# Patient Record
Sex: FEMALE | Race: WHITE | NOT HISPANIC OR LATINO | Employment: FULL TIME | URBAN - METROPOLITAN AREA
[De-identification: names, ages, dates, MRNs, and addresses within clinical notes are randomized per-mention and may not be internally consistent; named-entity substitution may affect disease eponyms.]

---

## 2017-03-13 ENCOUNTER — COMMUNICATION - HEALTHEAST (OUTPATIENT)
Dept: INTERNAL MEDICINE | Facility: CLINIC | Age: 62
End: 2017-03-13

## 2017-03-13 ENCOUNTER — AMBULATORY - HEALTHEAST (OUTPATIENT)
Dept: INTERNAL MEDICINE | Facility: CLINIC | Age: 62
End: 2017-03-13

## 2017-03-13 DIAGNOSIS — R79.89 ELEVATED TSH: ICD-10-CM

## 2017-03-17 ENCOUNTER — AMBULATORY - HEALTHEAST (OUTPATIENT)
Dept: LAB | Facility: CLINIC | Age: 62
End: 2017-03-17

## 2017-03-17 DIAGNOSIS — R79.89 ELEVATED TSH: ICD-10-CM

## 2017-07-07 ENCOUNTER — COMMUNICATION - HEALTHEAST (OUTPATIENT)
Dept: INTERNAL MEDICINE | Facility: CLINIC | Age: 62
End: 2017-07-07

## 2017-07-07 DIAGNOSIS — N95.2 ATROPHIC VAGINITIS: ICD-10-CM

## 2017-10-02 ENCOUNTER — COMMUNICATION - HEALTHEAST (OUTPATIENT)
Dept: INTERNAL MEDICINE | Facility: CLINIC | Age: 62
End: 2017-10-02

## 2017-10-02 DIAGNOSIS — F41.9 ANXIETY: ICD-10-CM

## 2017-10-09 ENCOUNTER — RECORDS - HEALTHEAST (OUTPATIENT)
Dept: ADMINISTRATIVE | Facility: OTHER | Age: 62
End: 2017-10-09

## 2017-10-10 ENCOUNTER — AMBULATORY - HEALTHEAST (OUTPATIENT)
Dept: LAB | Facility: CLINIC | Age: 62
End: 2017-10-10

## 2017-10-10 DIAGNOSIS — M25.50 ARTHRALGIA OF MULTIPLE JOINTS: ICD-10-CM

## 2017-10-12 LAB — ANA SER QL: 0.4 U

## 2017-11-06 ENCOUNTER — COMMUNICATION - HEALTHEAST (OUTPATIENT)
Dept: INTERNAL MEDICINE | Facility: CLINIC | Age: 62
End: 2017-11-06

## 2017-11-06 DIAGNOSIS — F41.9 ANXIETY: ICD-10-CM

## 2017-11-07 ENCOUNTER — COMMUNICATION - HEALTHEAST (OUTPATIENT)
Dept: INTERNAL MEDICINE | Facility: CLINIC | Age: 62
End: 2017-11-07

## 2017-11-07 DIAGNOSIS — F41.9 ANXIETY: ICD-10-CM

## 2017-11-08 ENCOUNTER — COMMUNICATION - HEALTHEAST (OUTPATIENT)
Dept: INTERNAL MEDICINE | Facility: CLINIC | Age: 62
End: 2017-11-08

## 2017-11-16 ENCOUNTER — AMBULATORY - HEALTHEAST (OUTPATIENT)
Dept: INTERNAL MEDICINE | Facility: CLINIC | Age: 62
End: 2017-11-16

## 2017-11-16 ENCOUNTER — COMMUNICATION - HEALTHEAST (OUTPATIENT)
Dept: INTERNAL MEDICINE | Facility: CLINIC | Age: 62
End: 2017-11-16

## 2017-11-16 DIAGNOSIS — F41.9 ANXIETY: ICD-10-CM

## 2017-11-27 ENCOUNTER — RECORDS - HEALTHEAST (OUTPATIENT)
Dept: ADMINISTRATIVE | Facility: OTHER | Age: 62
End: 2017-11-27

## 2018-01-04 ENCOUNTER — COMMUNICATION - HEALTHEAST (OUTPATIENT)
Dept: INTERNAL MEDICINE | Facility: CLINIC | Age: 63
End: 2018-01-04

## 2018-01-04 DIAGNOSIS — F41.9 ANXIETY: ICD-10-CM

## 2018-02-02 ENCOUNTER — COMMUNICATION - HEALTHEAST (OUTPATIENT)
Dept: INTERNAL MEDICINE | Facility: CLINIC | Age: 63
End: 2018-02-02

## 2018-02-02 DIAGNOSIS — F41.9 ANXIETY: ICD-10-CM

## 2018-04-03 ENCOUNTER — COMMUNICATION - HEALTHEAST (OUTPATIENT)
Dept: INTERNAL MEDICINE | Facility: CLINIC | Age: 63
End: 2018-04-03

## 2018-04-03 DIAGNOSIS — F41.9 ANXIETY: ICD-10-CM

## 2018-04-20 ENCOUNTER — OFFICE VISIT - HEALTHEAST (OUTPATIENT)
Dept: INTERNAL MEDICINE | Facility: CLINIC | Age: 63
End: 2018-04-20

## 2018-04-20 DIAGNOSIS — R42 LIGHT HEADEDNESS: ICD-10-CM

## 2018-04-20 DIAGNOSIS — N95.2 ATROPHIC VAGINITIS: ICD-10-CM

## 2018-04-20 DIAGNOSIS — E55.9 VITAMIN D DEFICIENCY: ICD-10-CM

## 2018-04-20 DIAGNOSIS — F41.9 ANXIETY: ICD-10-CM

## 2018-04-20 DIAGNOSIS — M85.80 LOW BONE MASS: ICD-10-CM

## 2018-04-20 DIAGNOSIS — Z00.00 PREVENTATIVE HEALTH CARE: ICD-10-CM

## 2018-04-20 DIAGNOSIS — Z12.11 SCREEN FOR COLON CANCER: ICD-10-CM

## 2018-04-20 LAB
ANION GAP SERPL CALCULATED.3IONS-SCNC: 13 MMOL/L (ref 5–18)
BUN SERPL-MCNC: 10 MG/DL (ref 8–22)
CALCIUM SERPL-MCNC: 9.5 MG/DL (ref 8.5–10.5)
CHLORIDE BLD-SCNC: 105 MMOL/L (ref 98–107)
CHOLEST SERPL-MCNC: 244 MG/DL
CO2 SERPL-SCNC: 22 MMOL/L (ref 22–31)
CREAT SERPL-MCNC: 0.76 MG/DL (ref 0.6–1.1)
FASTING STATUS PATIENT QL REPORTED: YES
GFR SERPL CREATININE-BSD FRML MDRD: >60 ML/MIN/1.73M2
GLUCOSE BLD-MCNC: 95 MG/DL (ref 70–125)
HDLC SERPL-MCNC: 70 MG/DL
LDLC SERPL CALC-MCNC: 148 MG/DL
POTASSIUM BLD-SCNC: 4.6 MMOL/L (ref 3.5–5)
SODIUM SERPL-SCNC: 140 MMOL/L (ref 136–145)
TRIGL SERPL-MCNC: 132 MG/DL

## 2018-04-20 ASSESSMENT — MIFFLIN-ST. JEOR: SCORE: 1085.2

## 2018-04-23 LAB
25(OH)D3 SERPL-MCNC: 31.9 NG/ML (ref 30–80)
25(OH)D3 SERPL-MCNC: 31.9 NG/ML (ref 30–80)

## 2018-04-25 ENCOUNTER — RECORDS - HEALTHEAST (OUTPATIENT)
Dept: MAMMOGRAPHY | Facility: CLINIC | Age: 63
End: 2018-04-25

## 2018-04-25 DIAGNOSIS — Z00.00 ENCOUNTER FOR GENERAL ADULT MEDICAL EXAMINATION WITHOUT ABNORMAL FINDINGS: ICD-10-CM

## 2018-04-30 ENCOUNTER — RECORDS - HEALTHEAST (OUTPATIENT)
Dept: ADMINISTRATIVE | Facility: OTHER | Age: 63
End: 2018-04-30

## 2018-10-26 ENCOUNTER — OFFICE VISIT - HEALTHEAST (OUTPATIENT)
Dept: FAMILY MEDICINE | Facility: CLINIC | Age: 63
End: 2018-10-26

## 2018-10-26 DIAGNOSIS — N39.0 URINARY TRACT INFECTION WITHOUT HEMATURIA, SITE UNSPECIFIED: ICD-10-CM

## 2018-10-26 DIAGNOSIS — R30.0 DYSURIA: ICD-10-CM

## 2018-10-26 DIAGNOSIS — L29.2 VULVAR ITCHING: ICD-10-CM

## 2018-10-26 LAB
ALBUMIN UR-MCNC: NEGATIVE MG/DL
APPEARANCE UR: CLEAR
BACTERIA #/AREA URNS HPF: ABNORMAL HPF
BILIRUB UR QL STRIP: NEGATIVE
CLUE CELLS: NORMAL
COLOR UR AUTO: YELLOW
GLUCOSE UR STRIP-MCNC: NEGATIVE MG/DL
HGB UR QL STRIP: NEGATIVE
KETONES UR STRIP-MCNC: NEGATIVE MG/DL
LEUKOCYTE ESTERASE UR QL STRIP: ABNORMAL
NITRATE UR QL: NEGATIVE
PH UR STRIP: 5.5 [PH] (ref 5–8)
RBC #/AREA URNS AUTO: ABNORMAL HPF
SP GR UR STRIP: 1.01 (ref 1–1.03)
SQUAMOUS #/AREA URNS AUTO: ABNORMAL LPF
TRICHOMONAS, WET PREP: NORMAL
UROBILINOGEN UR STRIP-ACNC: ABNORMAL
WBC #/AREA URNS AUTO: ABNORMAL HPF
YEAST, WET PREP: NORMAL

## 2018-11-16 ENCOUNTER — RECORDS - HEALTHEAST (OUTPATIENT)
Dept: ADMINISTRATIVE | Facility: OTHER | Age: 63
End: 2018-11-16

## 2019-03-04 ENCOUNTER — COMMUNICATION - HEALTHEAST (OUTPATIENT)
Dept: INTERNAL MEDICINE | Facility: CLINIC | Age: 64
End: 2019-03-04

## 2019-03-04 DIAGNOSIS — N95.2 ATROPHIC VAGINITIS: ICD-10-CM

## 2019-04-09 ENCOUNTER — COMMUNICATION - HEALTHEAST (OUTPATIENT)
Dept: INTERNAL MEDICINE | Facility: CLINIC | Age: 64
End: 2019-04-09

## 2019-05-23 ENCOUNTER — COMMUNICATION - HEALTHEAST (OUTPATIENT)
Dept: INTERNAL MEDICINE | Facility: CLINIC | Age: 64
End: 2019-05-23

## 2019-05-31 ENCOUNTER — COMMUNICATION - HEALTHEAST (OUTPATIENT)
Dept: INTERNAL MEDICINE | Facility: CLINIC | Age: 64
End: 2019-05-31

## 2019-05-31 DIAGNOSIS — N95.2 ATROPHIC VAGINITIS: ICD-10-CM

## 2019-06-28 ENCOUNTER — COMMUNICATION - HEALTHEAST (OUTPATIENT)
Dept: INTERNAL MEDICINE | Facility: CLINIC | Age: 64
End: 2019-06-28

## 2019-07-25 ENCOUNTER — COMMUNICATION - HEALTHEAST (OUTPATIENT)
Dept: INTERNAL MEDICINE | Facility: CLINIC | Age: 64
End: 2019-07-25

## 2019-08-29 ENCOUNTER — COMMUNICATION - HEALTHEAST (OUTPATIENT)
Dept: INTERNAL MEDICINE | Facility: CLINIC | Age: 64
End: 2019-08-29

## 2020-03-02 ENCOUNTER — RECORDS - HEALTHEAST (OUTPATIENT)
Dept: ADMINISTRATIVE | Facility: OTHER | Age: 65
End: 2020-03-02

## 2020-06-25 ENCOUNTER — COMMUNICATION - HEALTHEAST (OUTPATIENT)
Dept: INTERNAL MEDICINE | Facility: CLINIC | Age: 65
End: 2020-06-25

## 2020-06-25 DIAGNOSIS — Z12.31 ENCOUNTER FOR SCREENING MAMMOGRAM FOR MALIGNANT NEOPLASM OF BREAST: ICD-10-CM

## 2020-06-25 DIAGNOSIS — Z00.00 WELCOME TO MEDICARE PREVENTIVE VISIT: ICD-10-CM

## 2020-06-25 DIAGNOSIS — E55.9 VITAMIN D DEFICIENCY: ICD-10-CM

## 2020-07-08 ENCOUNTER — HOSPITAL ENCOUNTER (OUTPATIENT)
Dept: ULTRASOUND IMAGING | Facility: CLINIC | Age: 65
Discharge: HOME | End: 2020-07-08
Attending: INTERNAL MEDICINE

## 2020-07-08 ENCOUNTER — AMBULATORY - HEALTHEAST (OUTPATIENT)
Dept: LAB | Facility: CLINIC | Age: 65
End: 2020-07-08

## 2020-07-08 DIAGNOSIS — E55.9 VITAMIN D DEFICIENCY: ICD-10-CM

## 2020-07-08 DIAGNOSIS — Z00.00 WELCOME TO MEDICARE PREVENTIVE VISIT: ICD-10-CM

## 2020-07-08 LAB
ALBUMIN SERPL-MCNC: 4 G/DL (ref 3.5–5)
ALP SERPL-CCNC: 56 U/L (ref 45–120)
ALT SERPL W P-5'-P-CCNC: 11 U/L (ref 0–45)
ANION GAP SERPL CALCULATED.3IONS-SCNC: 10 MMOL/L (ref 5–18)
AST SERPL W P-5'-P-CCNC: 16 U/L (ref 0–40)
BILIRUB SERPL-MCNC: 0.5 MG/DL (ref 0–1)
BUN SERPL-MCNC: 12 MG/DL (ref 8–22)
CALCIUM SERPL-MCNC: 9.3 MG/DL (ref 8.5–10.5)
CHLORIDE BLD-SCNC: 106 MMOL/L (ref 98–107)
CHOLEST SERPL-MCNC: 219 MG/DL
CO2 SERPL-SCNC: 24 MMOL/L (ref 22–31)
CREAT SERPL-MCNC: 0.79 MG/DL (ref 0.6–1.1)
ERYTHROCYTE [DISTWIDTH] IN BLOOD BY AUTOMATED COUNT: 11.6 % (ref 11–14.5)
FASTING STATUS PATIENT QL REPORTED: YES
GFR SERPL CREATININE-BSD FRML MDRD: >60 ML/MIN/1.73M2
GLUCOSE BLD-MCNC: 98 MG/DL (ref 70–125)
HBA1C MFR BLD: 5.6 % (ref 3.5–6)
HCT VFR BLD AUTO: 43.7 % (ref 35–47)
HDLC SERPL-MCNC: 73 MG/DL
HGB BLD-MCNC: 14.4 G/DL (ref 12–16)
LDLC SERPL CALC-MCNC: 115 MG/DL
MCH RBC QN AUTO: 31.1 PG (ref 27–34)
MCHC RBC AUTO-ENTMCNC: 33 G/DL (ref 32–36)
MCV RBC AUTO: 94 FL (ref 80–100)
PLATELET # BLD AUTO: 204 THOU/UL (ref 140–440)
PMV BLD AUTO: 8.3 FL (ref 7–10)
POTASSIUM BLD-SCNC: 4.3 MMOL/L (ref 3.5–5)
PROT SERPL-MCNC: 6.5 G/DL (ref 6–8)
RBC # BLD AUTO: 4.64 MILL/UL (ref 3.8–5.4)
SODIUM SERPL-SCNC: 140 MMOL/L (ref 136–145)
TRIGL SERPL-MCNC: 155 MG/DL
WBC: 4.5 THOU/UL (ref 4–11)

## 2020-07-09 LAB
25(OH)D3 SERPL-MCNC: 32.4 NG/ML (ref 30–80)
25(OH)D3 SERPL-MCNC: 32.4 NG/ML (ref 30–80)

## 2020-08-04 ENCOUNTER — HOSPITAL ENCOUNTER (OUTPATIENT)
Dept: MAMMOGRAPHY | Facility: CLINIC | Age: 65
Discharge: HOME | End: 2020-08-04
Attending: INTERNAL MEDICINE

## 2020-08-04 DIAGNOSIS — Z00.00 WELCOME TO MEDICARE PREVENTIVE VISIT: ICD-10-CM

## 2020-08-04 DIAGNOSIS — Z12.31 ENCOUNTER FOR SCREENING MAMMOGRAM FOR MALIGNANT NEOPLASM OF BREAST: ICD-10-CM

## 2020-09-25 ENCOUNTER — OFFICE VISIT - HEALTHEAST (OUTPATIENT)
Dept: INTERNAL MEDICINE | Facility: CLINIC | Age: 65
End: 2020-09-25

## 2020-09-25 DIAGNOSIS — F41.9 ANXIETY: ICD-10-CM

## 2020-09-25 DIAGNOSIS — N95.2 ATROPHIC VAGINITIS: ICD-10-CM

## 2020-09-25 DIAGNOSIS — G89.29 CHRONIC PAIN OF LEFT KNEE: ICD-10-CM

## 2020-09-25 DIAGNOSIS — E55.9 VITAMIN D DEFICIENCY: ICD-10-CM

## 2020-09-25 DIAGNOSIS — M25.562 CHRONIC PAIN OF LEFT KNEE: ICD-10-CM

## 2020-09-25 DIAGNOSIS — M85.80 LOW BONE MASS: ICD-10-CM

## 2020-09-25 DIAGNOSIS — F34.1 DYSTHYMIC DISORDER: ICD-10-CM

## 2020-09-25 DIAGNOSIS — Z00.00 WELCOME TO MEDICARE PREVENTIVE VISIT: ICD-10-CM

## 2020-09-25 RX ORDER — SERTRALINE HYDROCHLORIDE 100 MG/1
100 TABLET, FILM COATED ORAL DAILY
Qty: 90 TABLET | Refills: 3 | Status: SHIPPED | OUTPATIENT
Start: 2020-09-25 | End: 2021-08-27

## 2020-09-25 ASSESSMENT — MIFFLIN-ST. JEOR: SCORE: 1046.64

## 2020-09-29 LAB
ATRIAL RATE - MUSE: 73 BPM
DIASTOLIC BLOOD PRESSURE - MUSE: NORMAL
INTERPRETATION ECG - MUSE: NORMAL
P AXIS - MUSE: 69 DEGREES
PR INTERVAL - MUSE: 174 MS
QRS DURATION - MUSE: 64 MS
QT - MUSE: 394 MS
QTC - MUSE: 434 MS
R AXIS - MUSE: -2 DEGREES
SYSTOLIC BLOOD PRESSURE - MUSE: NORMAL
T AXIS - MUSE: 85 DEGREES
VENTRICULAR RATE- MUSE: 73 BPM

## 2020-09-30 ENCOUNTER — COMMUNICATION - HEALTHEAST (OUTPATIENT)
Dept: INTERNAL MEDICINE | Facility: CLINIC | Age: 65
End: 2020-09-30

## 2020-10-22 ENCOUNTER — VIRTUAL VISIT (OUTPATIENT)
Dept: FAMILY MEDICINE | Facility: OTHER | Age: 65
End: 2020-10-22
Payer: COMMERCIAL

## 2020-10-22 ENCOUNTER — AMBULATORY - HEALTHEAST (OUTPATIENT)
Dept: INTERNAL MEDICINE | Facility: CLINIC | Age: 65
End: 2020-10-22

## 2020-10-22 DIAGNOSIS — Z20.822 SUSPECTED 2019 NOVEL CORONAVIRUS INFECTION: ICD-10-CM

## 2020-10-22 PROCEDURE — 99421 OL DIG E/M SVC 5-10 MIN: CPT | Performed by: PHYSICIAN ASSISTANT

## 2020-10-22 NOTE — PROGRESS NOTES
"Date: 10/22/2020 12:15:30  Clinician: Maciel Maxwell  Clinician NPI: [id number]  Patient: Addie Carbone  Patient : 1955  Patient Address: [address] [place] [address]  Patient Phone: [phone]  Visit Protocol: URI  Patient Summary:  Addie is a 64 year old ( : 1955 ) female who initiated a [facility] Visit for COVID-19 (Coronavirus) evaluation and screening. When asked the question \"Please sign me up to receive news, health information and promotions from [facility].\", Addie responded \"Yes\".    Addie states her symptoms started suddenly 3-4 days ago.   Her symptoms consist of ear pain, a headache, a cough, nasal congestion, rhinitis, and malaise. She is experiencing difficulty breathing due to nasal congestion but she is not short of breath.   Symptom details     Nasal secretions: The color of her mucus is clear.    Cough: Addie coughs a few times an hour and her cough is not more bothersome at night. Phlegm does not come into her throat when she coughs. She believes her cough is caused by post-nasal drip.     Headache: She states the headache is mild (1-3 on a 10 point pain scale).      Addie denies having wheezing, fever, anosmia, vomiting, nausea, facial pain or pressure, myalgias, chills, sore throat, teeth pain, ageusia, and diarrhea. She also denies double sickening (worsening symptoms after initial improvement), taking antibiotic medication in the past month, and having recent facial or sinus surgery in the past 60 days.   Precipitating events  She has not recently been exposed to someone with influenza. Addie has been in close contact with the following high risk individuals: adults 65 or older.   Pertinent COVID-19 (Coronavirus) information  In the past 14 days, Addie has not worked in a congregate living setting.   She does not work or volunteer as healthcare worker or a  and does not work or volunteer in a healthcare facility.   Addie also has not lived in a congregate " living setting in the past 14 days. She does not live with a healthcare worker.   Addie has not had a close contact with a laboratory-confirmed COVID-19 patient within 14 days of symptom onset.   Since December 2019, Addie and has not had upper respiratory infection or influenza-like illness. Has not been diagnosed with lab-confirmed COVID-19 test   Pertinent medical history  Addie does not get yeast infections when she takes antibiotics.   Addie does not need a return to work/school note.   Weight: 120 lbs   Addie does not smoke or use smokeless tobacco.   Additional information as reported by the patient (free text): Symptoms started with a sore throat which has resolved. Have been running a low grade fever. My  is responsible for a team processing (onsite) [employer] absentee ballots. I have been isolating from him. Am wondering if I should rule out Covid...would hate to infect the election results team!!   Weight: 120 lbs    MEDICATIONS: Vagifem vaginal, alendronate oral, sertraline oral, ALLERGIES: Sulfa (Sulfonamide Antibiotics)  Clinician Response:  Dear Addei,   Based on your exposure to COVID-19 (coronavirus), we would like to test you for this virus.  1. Please call [phone] to schedule your visit. Explain that you were referred by [facility] to have a COVID-19 test. Be ready to share your [facility] visit ID number.  The following will serve as your written order for this COVID Test, ordered by me, for the indication of suspected COVID [Z20.828]: The test will be ordered in [employer], our electronic health record, after you are scheduled. It will show as ordered and authorized by Ahsan Cooper MD.  Order: COVID-19 (coronavirus) PCR for ASYMPTOMATIC EXPOSURE testing from [facility].  If you know you have had close contact with someone who tested positive, you should be quarantined for 14 days after this exposure. You should stay in quarantine for the14 days even if the covid test is negative, the optimal time to  test after exposure is 5-7 days from the exposure  Quarantine means   What should I do?  For safety, it's very important to follow these rules. Do this for 14 days after the date you were last exposed to the virus..  Stay home and away from others. Don't go to school or anywhere else. Generally quarantine means staying home from work but there are some exceptions to this. Please contact your workplace.   No hugging, kissing or shaking hands.  Don't let anyone visit.  Cover your mouth and nose with a mask, tissue or washcloth to avoid spreading germs.  Wash your hands and face often. Use soap and water.  What are the symptoms of COVID-19?  The most common symptoms are cough, fever and trouble breathing. Less common symptoms include headache, body aches, fatigue (feeling very tired), chills, sore throat, stuffy or runny nose, diarrhea (loose poop), loss of taste or smell, belly pain, and nausea or vomiting (feeling sick to your stomach or throwing up).  After 14 days, if you have still don't have symptoms, you likely don't have this virus.  If you develop symptoms, follow these guidelines.  If you're normally healthy: Please start another [facility] visit to report your symptoms. Go to [email or web address].  If you have a serious health problem (like cancer, heart failure, an organ transplant or kidney disease): Call your specialty clinic. Let them know that you might have COVID-19.  2. When it's time for your COVID test:  Stay at least 6 feet away from others. (If someone will drive you to your test, stay in the backseat, as far away from the  as you can.)  Cover your mouth and nose with a mask, tissue or washcloth.  Go straight to the testing site. Don't make any stops on the way there or back.  Please note  Caregivers in these groups are at risk for severe illness due to COVID-19:  o People 65 years and older  o People who live in a nursing home or long-term care facility  o People with chronic disease (lung, heart,  cancer, diabetes, kidney, liver, immunologic)  o People who have a weakened immune system, including those who:  Are in cancer treatment  Take medicine that weakens the immune system, such as corticosteroids  Had a bone marrow or organ transplant  Have an immune deficiency  Have poorly controlled HIV or AIDS  Are obese (body mass index of 40 or higher)  Smoke regularly  Where can I get more information?  [facility] -- About COVID-19: [email or web address]  CDC -- What to Do If You're Sick: www.cdc.gov/coronavirus/2019-ncov/about/steps-when-sick.html  CDC -- Ending Home Isolation: www.cdc.gov/coronavirus/2019-ncov/hcp/disposition-in-home-patients.html  CDC -- Caring for Someone: www.cdc.gov/coronavirus/2019-ncov/if-you-are-sick/care-for-someone.html  [facility] -- Interim Guidance for Hospital Discharge to Home: www.health.[facility].mn./diseases/coronavirus/hcp/hospdischarge.pdf  [facility] clinical trials (COVID-19 research studies): [email or web address]-clinical-trials  Below are the COVID-19 hotlines at the [facility] of [facility] ([facility]). Interpreters are available.  For health questions: Call 631-449-7340 or 1-643.395.4620 (7 a.m. to 7 p.m.)  For questions about schools and childcare: Call 943-646-9754 or 1-882.594.1130 (7 a.m. to 7 p.m.)    Diagnosis: Contact with and (suspected) exposure to other viral communicable diseases  Diagnosis ICD: Z20.828

## 2020-10-25 ENCOUNTER — AMBULATORY - HEALTHEAST (OUTPATIENT)
Dept: FAMILY MEDICINE | Facility: CLINIC | Age: 65
End: 2020-10-25

## 2020-10-25 DIAGNOSIS — Z20.822 SUSPECTED 2019 NOVEL CORONAVIRUS INFECTION: ICD-10-CM

## 2020-11-17 ENCOUNTER — COMMUNICATION - HEALTHEAST (OUTPATIENT)
Dept: INTERNAL MEDICINE | Facility: CLINIC | Age: 65
End: 2020-11-17

## 2020-11-17 DIAGNOSIS — M85.80 LOW BONE MASS: ICD-10-CM

## 2020-11-17 DIAGNOSIS — N95.2 ATROPHIC VAGINITIS: ICD-10-CM

## 2020-12-28 ENCOUNTER — RECORDS - HEALTHEAST (OUTPATIENT)
Dept: ADMINISTRATIVE | Facility: OTHER | Age: 65
End: 2020-12-28

## 2021-03-08 ENCOUNTER — COMMUNICATION - HEALTHEAST (OUTPATIENT)
Dept: INTERNAL MEDICINE | Facility: CLINIC | Age: 66
End: 2021-03-08

## 2021-03-08 DIAGNOSIS — N95.2 ATROPHIC VAGINITIS: ICD-10-CM

## 2021-03-08 DIAGNOSIS — M85.80 LOW BONE MASS: ICD-10-CM

## 2021-03-09 RX ORDER — ESTRADIOL 10 UG/1
INSERT VAGINAL
Qty: 108 TABLET | Refills: 2 | Status: SHIPPED | OUTPATIENT
Start: 2021-03-09 | End: 2021-09-22

## 2021-05-07 ENCOUNTER — OFFICE VISIT - HEALTHEAST (OUTPATIENT)
Dept: INTERNAL MEDICINE | Facility: CLINIC | Age: 66
End: 2021-05-07

## 2021-05-07 DIAGNOSIS — M25.562 LEFT KNEE PAIN, UNSPECIFIED CHRONICITY: ICD-10-CM

## 2021-05-07 ASSESSMENT — PATIENT HEALTH QUESTIONNAIRE - PHQ9: SUM OF ALL RESPONSES TO PHQ QUESTIONS 1-9: 0

## 2021-05-24 ENCOUNTER — RECORDS - HEALTHEAST (OUTPATIENT)
Dept: ADMINISTRATIVE | Facility: OTHER | Age: 66
End: 2021-05-24

## 2021-05-27 ASSESSMENT — PATIENT HEALTH QUESTIONNAIRE - PHQ9: SUM OF ALL RESPONSES TO PHQ QUESTIONS 1-9: 0

## 2021-05-29 NOTE — TELEPHONE ENCOUNTER
RN cannot approve Refill Request    RN can NOT refill this medication Protocol failed and NO refill given. Last office visit: 4/20/2018 Sanjana Mike MD Last Physical: 8/31/2016 Last MTM visit: Visit date not found Last visit same specialty: 4/20/2018 Sanjana Mike MD.  Next visit within 3 mo: Visit date not found  Next physical within 3 mo: Visit date not found      Carlene Rocha, [facility] Triage/Med Refill 6/2/2019    Requested Prescriptions   Pending Prescriptions Disp Refills     estradiol (VAGIFEM) 10 mcg Tab [Pharmacy Med Name: ESTRADIOL 10 MCG TAB 10 TAB] 12 tablet 2     Sig: INSERT 1 TABLET (10 MCG TOTAL) INTO THE VAGINA 3 TIMES A WEEK.       Hormone Replacement Therapy Refill Protocol Failed - 5/31/2019  2:21 PM        Failed - PCP or prescribing provider visit in past 12 months       Last office visit with prescriber/PCP: 4/20/2018 Sanjana Mike MD OR same dept: Visit date not found OR same specialty: 4/20/2018 Sanjana Mike MD  Last physical: 8/31/2016 Last MTM visit: Visit date not found     Next visit within 3 mo: Visit date not found  Next physical within 3 mo: Visit date not found  Prescriber OR PCP: Sanjana Mike MD  Last diagnosis associated with med order: 1. Atrophic vaginitis  - estradiol (VAGIFEM) 10 mcg Tab [Pharmacy Med Name: ESTRADIOL 10 MCG TAB 10 TAB]; INSERT 1 TABLET (10 MCG TOTAL) INTO THE VAGINA 3 TIMES A WEEK.  Dispense: 12 tablet; Refill: 2     If protocol passes may refill for 3 months.

## 2021-05-31 ENCOUNTER — RECORDS - HEALTHEAST (OUTPATIENT)
Dept: INTERNAL MEDICINE | Facility: CLINIC | Age: 66
End: 2021-05-31

## 2021-06-01 ENCOUNTER — AMBULATORY - HEALTHEAST (OUTPATIENT)
Dept: INTERNAL MEDICINE | Facility: CLINIC | Age: 66
End: 2021-06-01

## 2021-06-01 VITALS — HEIGHT: 61 IN | BODY MASS INDEX: 25.3 KG/M2 | WEIGHT: 134 LBS

## 2021-06-01 DIAGNOSIS — Z12.12 ENCOUNTER FOR COLORECTAL CANCER SCREENING: ICD-10-CM

## 2021-06-01 DIAGNOSIS — Z12.11 ENCOUNTER FOR COLORECTAL CANCER SCREENING: ICD-10-CM

## 2021-06-02 ENCOUNTER — COMMUNICATION - HEALTHEAST (OUTPATIENT)
Dept: INTERNAL MEDICINE | Facility: CLINIC | Age: 66
End: 2021-06-02

## 2021-06-02 ENCOUNTER — RECORDS - HEALTHEAST (OUTPATIENT)
Dept: ADMINISTRATIVE | Facility: CLINIC | Age: 66
End: 2021-06-02

## 2021-06-02 VITALS — BODY MASS INDEX: 24.81 KG/M2 | WEIGHT: 131.3 LBS

## 2021-06-02 DIAGNOSIS — M85.80 LOW BONE MASS: ICD-10-CM

## 2021-06-03 RX ORDER — ALENDRONATE SODIUM 70 MG/1
TABLET ORAL
Qty: 12 TABLET | Refills: 0 | Status: SHIPPED | OUTPATIENT
Start: 2021-06-03 | End: 2021-08-27

## 2021-06-05 VITALS — RESPIRATION RATE: 16 BRPM | HEIGHT: 61 IN | BODY MASS INDEX: 24.02 KG/M2 | WEIGHT: 127.25 LBS

## 2021-06-09 NOTE — TELEPHONE ENCOUNTER
also ordered an ultrasound of the aorta.  This is covered by the welcome to Medicare physical as well.  If she wants to get this ahead of time, she may.

## 2021-06-09 NOTE — TELEPHONE ENCOUNTER
Rescheduled Addie's WTM visit to September but she would like to have her labwork and mammogram sooner. Please enter orders. She is scheduled for labwork in [facility] in 2 weeks and knows that mammography will contact her.

## 2021-06-10 ENCOUNTER — COMMUNICATION - HEALTHEAST (OUTPATIENT)
Dept: INTERNAL MEDICINE | Facility: CLINIC | Age: 66
End: 2021-06-10

## 2021-06-10 ENCOUNTER — AMBULATORY - HEALTHEAST (OUTPATIENT)
Dept: INTERNAL MEDICINE | Facility: CLINIC | Age: 66
End: 2021-06-10

## 2021-06-10 DIAGNOSIS — Z00.00 ROUTINE HEALTH MAINTENANCE: ICD-10-CM

## 2021-06-11 NOTE — PROGRESS NOTES
Assessment and Plan:   Welcome to Medicare forms reviewed.  No new needs identified    1. Welcome to Medicare preventive visit  Electrocardiogram reviewed.  Nonspecific changes are noted but nothing of concern.  Mammography up-to-date.  Bone mineral density done at [facility] in 2019.  Colon cancer screening done at that time as well with fit test.  Cologuard in 2018.  She is exercising regularly.  - Electrocardiogram Perform and Read  - Cologuard    2. Dysthymic disorder  Stable on sertraline    3. Atrophic vaginitis  Stable on estradiol  - estradioL (VAGIFEM) 10 mcg vaginal tablet; INSERT 1 TABLET (10 MCG TOTAL) INTO THE VAGINA 3 TIMES A WEEK.  Dispense: 12 tablet; Refill: 2    4. Vitamin D deficiency  Would like her to increase vitamin D to 4000 IUs for 1 month/then resume usual dosing for goal of vitamin D level at 50.  Results from labs reviewed with her  - cholecalciferol, vitamin D3, 1,000 unit (25 mcg) tablet; Take 2 tablets (2,000 Units total) by mouth daily.; Refill: 0    5. Chronic pain of left knee-with intermittent exacerbation with going down stairs or hiking  Likely arthritic.  Pain stable with cycling.  Working with chiropractor.  Recommendation: Will x-ray knee.  Recommend physical therapy or physical therapy strengthening exercises.  If no improvement-referral to orthopedics  - XR Knee Left 1 or 2 VWS      6. Low bone mass-elevated FRAX per bone density at [facility]  Exam reviewed with her.  Have discussed bone health today.  Literature provided.  Because her risk of major osteoporotic fracture risk is greater than 20%, she is considered to have an osteoporosis equivalent.    Recommendations:  - alendronate (FOSAMAX) 70 MG tablet; Take 1 tablet (70 mg total) by mouth every 7 days. Take in the morning on an empty stomach with a full glass of water 30 minutes before food  Dispense: 4 tablet; Refill: 11   Weightbearing and balance exercise.  Have discussed calcium and vitamin D  requirements  The patient's current medical problems were reviewed.      The following health maintenance schedule was reviewed with the patient and provided in printed form in the after visit summary:   Health Maintenance   Topic Date Due     DEPRESSION ACTION PLAN  1955     HIV SCREENING  11/25/1969     ZOSTER VACCINES (2 of 3) 10/28/2016     MEDICARE ANNUAL WELLNESS VISIT  11/25/2019     PNEUMOCOCCAL IMMUNIZATION 65+ LOW/MEDIUM RISK (1 of 2 - PCV13) 11/25/2019     DXA SCAN  11/25/2019     FALL RISK ASSESSMENT  11/25/2019     ADVANCE CARE PLANNING  02/20/2020     INFLUENZA VACCINE RULE BASED (1) 08/01/2020     TD 18+ HE  12/17/2020     COLORECTAL CANCER SCREENING  04/30/2021     MAMMOGRAM  08/04/2022     LIPID  07/08/2025     HEPATITIS C SCREENING  Completed     TDAP ADULT ONE TIME DOSE  Completed     HEPATITIS B VACCINES  Aged Out        Subjective:   Chief Complaint: Addie Carbone is an 64 y.o. female here for a Welcome to Medicare visit.   HPI: Addie is a delightful 64-year-old female who is here today for her welcome to Medicare physical.  Of note, I have not seen her in a couple of years.  [facility]  The [facility] was reviewed.  She had bone density and fit test done there.    She states that overall she is doing well.  She does have some intermittent left knee pain that occurs with activity such as hiking and going down steep stairs.  For her usual activities of daily living, she is fine.  She denies any swelling.  The pain does not require daily medication.  She is working with her chiropractor on this issue.  She is doing some soft tissue evaluation.  She wonders if she has arthritis.  She has recently taken up cycling and this has not caused any exacerbation of pain.  She has had physical therapy for her knee in the past.  She is not doing any of those exercises currently.    Additionally, we have reviewed her bone density [facility].  She  has low bone mass with elevated FRAX.  Bone health was discussed today as well.    Health maintenance is reviewed and updated in the chart.  She is going to have the Shingrix vaccine in the near future.    Review of Systems:    Please see above.  The rest of the review of systems are negative for all systems.    Patient Care Team:  Sanjana Mike MD as PCP - General  Sanjana Mike MD as Assigned PCP     Patient Active Problem List   Diagnosis     Basal Cell Carcinoma Of The Skin     Vitamin D Deficiency     Dysthymic Disorder     Mammogram - Abnormal     Sarcoidosis     Hemorrhoids     No past medical history on file.   Past Surgical History:   Procedure Laterality Date     AK  DELIVERY ONLY      Description:  Section;  Recorded: 2010;  Comments: x2     AK LIGATE FALLOPIAN TUBE      Description: Tubal Ligation;  Recorded: 2010;      Family History   Problem Relation Age of Onset     COPD Mother      Heart disease Mother      Esophageal cancer Father      Heart disease Father      Hyperlipidemia Brother      Hypertension Brother      Heart disease Brother      Thyroid nodules Daughter      Depression Daughter       Social History     Socioeconomic History     Marital status: Single     Spouse name: Not on file     Number of children: Not on file     Years of education: Not on file     Highest education level: Not on file   Occupational History     Not on file   Social Needs     Financial resource strain: Not on file     Food insecurity     Worry: Not on file     Inability: Not on file     Transportation needs     Medical: Not on file     Non-medical: Not on file   Tobacco Use     Smoking status: Former Smoker     Last attempt to quit: 1975     Years since quittin.6     Smokeless tobacco: Never Used   Substance and Sexual Activity     Alcohol use: Not on file     Drug use: Not on file     Sexual activity: Not on file   Lifestyle     Physical activity     Days per week:  "Not on file     Minutes per session: Not on file     Stress: Not on file   Relationships     Social connections     Talks on phone: Not on file     Gets together: Not on file     Attends Islam service: Not on file     Active member of club or organization: Not on file     Attends meetings of clubs or organizations: Not on file     Relationship status: Not on file     Intimate partner violence     Fear of current or ex partner: Not on file     Emotionally abused: Not on file     Physically abused: Not on file     Forced sexual activity: Not on file   Other Topics Concern     Not on file   Social History Narrative     Not on file       Current Outpatient Medications   Medication Sig Dispense Refill     aspirin 81 MG EC tablet Take 81 mg by mouth every other day.       CALCIUM CARBONATE-VITAMIN D3 ORAL        cholecalciferol, vitamin D3, 1,000 unit (25 mcg) tablet Take 5,000 Units by mouth.       estradiol (VAGIFEM) 10 mcg Tab INSERT 1 TABLET (10 MCG TOTAL) INTO THE VAGINA 3 TIMES A WEEK. 12 tablet 2     sertraline (ZOLOFT) 100 MG tablet Take 1 tablet (100 mg total) by mouth daily. 90 tablet 3     MULTIVITAMIN ORAL Take 1 tablet by mouth daily.        No current facility-administered medications for this visit.       Objective:   Vital Signs:   Visit Vitals  Resp 16   Ht 5' 0.5\" (1.537 m)   Wt 127 lb 4 oz (57.7 kg)   BMI 24.44 kg/m         EKG: Personally reviewed with no signs of acute changes.    Physical examination:  EYES: Eyelids, conjunctiva, and sclera were normal. Pupils were normal. Cornea, iris, and lens were normal bilaterally.  HEAD, EARS, NOSE, MOUTH, AND THROAT: Head and face were normal. Hearing was normal to voice and the ears were normal to external exam. Nose appearance was normal and there was no discharge. Oropharynx was normal.  TMs were normal.  NECK: Neck appearance was normal. There were no neck masses and the thyroid was not enlarged.  RESPIRATORY: Breathing pattern was normal and the chest " moved symmetrically.   Lung sounds were equal bilaterally.  CARDIOVASCULAR: Heart rate and rhythm were normal.  S1 and S2 were normal and there were no extra sounds or murmurs. Peripheral pulses in arms and legs were normal.  Jugular venous pressure was normal.  There was no peripheral edema.  GASTROINTESTINAL: The abdomen was normal in contour.  Bowel sounds were present.   Palpation detected no tenderness, mass, or enlarged organs.   MUSCULOSKELETAL: Skeletal configuration was normal and muscle mass was normal for age. Joint appearance was overall normal.  LYMPHATIC: There were no enlarged nodes.  SKIN/HAIR/NAILS: Skin color was normal.  There were no abnormal skin lesions.  Hair and nails were normal.  NEUROLOGIC: The patient was alert and oriented to person, place, time, and circumstance. Speech was normal. Cranial nerves were normal. Motor strength was normal for age. The patient was normally coordinated.  PSYCHIATRIC:  Mood and affect were normal and the patient had normal recent and remote memory. The patient's judgment and insight were normal.  BREASTS: Examined bilaterally negative for masses, nipple discharge or axillary adenopathy        VisionScreening:   Hearing Screening    125Hz 250Hz 500Hz 1000Hz 2000Hz 3000Hz 4000Hz 6000Hz 8000Hz   Right ear:            Left ear:               Visual Acuity Screening    Right eye Left eye Both eyes   Without correction:      With correction: 10/12.5 10/10 10/8            Assessment Results 9/25/2020   Activities of Daily Living No help needed   Instrumental Activities of Daily Living No help needed   Get Up and Go Score Less than 12 seconds   Mini Cog Total Score 5   Some recent data might be hidden     A Mini Cog score of 0-2 suggests the possibility of dementia, score of 3-5 suggests no dementia    Identified Health Risks:     Patient's advanced directive was discussed and I am comfortable with the patient's wishes.

## 2021-06-11 NOTE — PATIENT INSTRUCTIONS - HE
Advance Directive  Patient s advance directive was discussed and I am comfortable with the patient s wishes.  Patient Education   Personalized Prevention Plan  You are due for the preventive services outlined below.  Your care team is available to assist you in scheduling these services.  If you have already completed any of these items, please share that information with your care team to update in your medical record.  Health Maintenance   Topic Date Due     DEPRESSION ACTION PLAN  1955     HIV SCREENING  11/25/1969     ZOSTER VACCINES (2 of 3) 10/28/2016     MEDICARE ANNUAL WELLNESS VISIT  11/25/2019     PNEUMOCOCCAL IMMUNIZATION 65+ LOW/MEDIUM RISK (1 of 2 - PCV13) 11/25/2019     DXA SCAN  11/25/2019     FALL RISK ASSESSMENT  11/25/2019     ADVANCE CARE PLANNING  02/20/2020     INFLUENZA VACCINE RULE BASED (1) 08/01/2020     TD 18+ HE  12/17/2020     COLORECTAL CANCER SCREENING  04/30/2021     MAMMOGRAM  08/04/2022     LIPID  07/08/2025     HEPATITIS C SCREENING  Completed     TDAP ADULT ONE TIME DOSE  Completed     HEPATITIS B VACCINES  Aged Out

## 2021-06-13 NOTE — TELEPHONE ENCOUNTER
Refill Approved    Rx renewed per Medication Renewal Policy. Medication was last renewed on 9/25/20.    Carol Ramos, [facility] Triage/Med Refill 11/19/2020     Requested Prescriptions   Pending Prescriptions Disp Refills     alendronate (FOSAMAX) 70 MG tablet [Pharmacy Med Name: ALENDRONATE NA 70 MG TAB* 70 Tablet] 4 tablet 11     Sig: TAKE 1 TABLET (70 MG TOTAL) BY MOUTH EVERY 7 DAYS. TAKE IN THE MORNING ON AN EMPTY STOMACH WITH A FULL GLASS OF WATER 30 MINUTES BEFORE FOOD.       Biphosphonates Refill Protocol Passed - 11/17/2020 12:38 PM        Passed - PCP or prescribing provider visit in last 12 months     Last office visit with prescriber/PCP: 4/20/2018 Sanjana Mike MD OR same dept: Visit date not found OR same specialty: 4/20/2018 Sanjana Mike MD  Last physical: 9/25/2020 Last MTM visit: Visit date not found   Next visit within 3 mo: Visit date not found  Next physical within 3 mo: Visit date not found  Prescriber OR PCP: Sanjana Mike MD  Last diagnosis associated with med order: 1. Low bone mass  - alendronate (FOSAMAX) 70 MG tablet [Pharmacy Med Name: ALENDRONATE NA 70 MG TAB* 70 Tablet]; Take 1 tablet (70 mg total) by mouth every 7 days. Take in the morning on an empty stomach with a full glass of water 30 minutes before food  Dispense: 4 tablet; Refill: 11    2. Atrophic vaginitis  - estradioL (VAGIFEM) 10 mcg vaginal tablet [Pharmacy Med Name: ESTRADIOL 10MCG VAGINAL TAB 10 Tablet]; INSERT 1 TABLET (10 MCG TOTAL) INTO THE VAGINA 3 TIMES A WEEK.  Dispense: 12 tablet; Refill: 2    If protocol passes may refill for 12 months if within 3 months of last provider visit (or a total of 15 months).             Passed - Serum creatinine in last 12 months     Creatinine   Date Value Ref Range Status   07/08/2020 0.79 0.60 - 1.10 mg/dL Final                estradioL (VAGIFEM) 10 mcg vaginal tablet [Pharmacy Med Name: ESTRADIOL 10MCG VAGINAL TAB 10 Tablet] 12 tablet 2     Sig: INSERT 1  TABLET (10 MCG TOTAL) INTO THE VAGINA 3 TIMES A WEEK.       Hormone Replacement Therapy Refill Protocol Passed - 11/17/2020 12:38 PM        Passed - PCP or prescribing provider visit in past 12 months       Last office visit with prescriber/PCP: 4/20/2018 Sanjana Mike MD OR same dept: Visit date not found OR same specialty: 4/20/2018 Sanjana Mike MD  Last physical: 9/25/2020 Last MTM visit: Visit date not found     Next visit within 3 mo: Visit date not found  Next physical within 3 mo: Visit date not found  Prescriber OR PCP: Sanjana Mike MD  Last diagnosis associated with med order: 1. Low bone mass  - alendronate (FOSAMAX) 70 MG tablet [Pharmacy Med Name: ALENDRONATE NA 70 MG TAB* 70 Tablet]; Take 1 tablet (70 mg total) by mouth every 7 days. Take in the morning on an empty stomach with a full glass of water 30 minutes before food  Dispense: 4 tablet; Refill: 11    2. Atrophic vaginitis  - estradioL (VAGIFEM) 10 mcg vaginal tablet [Pharmacy Med Name: ESTRADIOL 10MCG VAGINAL TAB 10 Tablet]; INSERT 1 TABLET (10 MCG TOTAL) INTO THE VAGINA 3 TIMES A WEEK.  Dispense: 12 tablet; Refill: 2     If protocol passes may refill for 3 months.

## 2021-06-15 ENCOUNTER — AMBULATORY - HEALTHEAST (OUTPATIENT)
Dept: MULTI SPECIALTY CLINIC | Facility: CLINIC | Age: 66
End: 2021-06-15

## 2021-06-15 LAB — COLOGUARD-ABSTRACT: NEGATIVE

## 2021-06-15 NOTE — TELEPHONE ENCOUNTER
Refill Approved    Rx renewed per Medication Renewal Policy. Medication was last renewed on 11/19/20.    Prashanth Dietrich, [facility] Triage/Med Refill 3/9/2021     Requested Prescriptions   Pending Prescriptions Disp Refills     alendronate (FOSAMAX) 70 MG tablet [Pharmacy Med Name: ALENDRONATE NA 70 MG TAB## 70 Tablet] 12 tablet 1     Sig: TAKE 1 TABLET (70 MG TOTAL) BY MOUTH EVERY 7 DAYS. TAKE IN THE MORNING ON AN EMPTY STOMACH WITH A FULL GLASS OF WATER 30 MINUTES BEFOR FOOD       Biphosphonates Refill Protocol Passed - 3/8/2021 11:40 AM        Passed - PCP or prescribing provider visit in last 12 months     Last office visit with prescriber/PCP: 4/20/2018 Sanjana Mike MD OR same dept: Visit date not found OR same specialty: 4/20/2018 Sanjana Mike MD  Last physical: 9/25/2020 Last MTM visit: Visit date not found   Next visit within 3 mo: Visit date not found  Next physical within 3 mo: Visit date not found  Prescriber OR PCP: Sanjana Mike MD  Last diagnosis associated with med order: 1. Low bone mass  - alendronate (FOSAMAX) 70 MG tablet [Pharmacy Med Name: ALENDRONATE NA 70 MG TAB## 70 Tablet]; TAKE 1 TABLET (70 MG TOTAL) BY MOUTH EVERY 7 DAYS. TAKE IN THE MORNING ON AN EMPTY STOMACH WITH A FULL GLASS OF WATER 30 MINUTES BEFOR FOOD  Dispense: 12 tablet; Refill: 1    2. Atrophic vaginitis  - estradioL (VAGIFEM) 10 mcg vaginal tablet [Pharmacy Med Name: ESTRADIOL 10MCG VAGINAL TAB 10 Tablet]; INSERT 1 TABLET (10 MCG TOTAL) INTO THE VAGINA 3 TIMES A WEEK.  Dispense: 36 tablet; Refill: 11    If protocol passes may refill for 12 months if within 3 months of last provider visit (or a total of 15 months).             Passed - Serum creatinine in last 12 months     Creatinine   Date Value Ref Range Status   07/08/2020 0.79 0.60 - 1.10 mg/dL Final                estradioL (VAGIFEM) 10 mcg vaginal tablet [Pharmacy Med Name: ESTRADIOL 10MCG VAGINAL TAB 10 Tablet] 36 tablet 11     Sig: INSERT 1 TABLET  (10 MCG TOTAL) INTO THE VAGINA 3 TIMES A WEEK.       Hormone Replacement Therapy Refill Protocol Passed - 3/8/2021 11:40 AM        Passed - PCP or prescribing provider visit in past 12 months       Last office visit with prescriber/PCP: 4/20/2018 Sanjana Mike MD OR same dept: Visit date not found OR same specialty: 4/20/2018 Sanjana Mike MD  Last physical: 9/25/2020 Last MTM visit: Visit date not found     Next visit within 3 mo: Visit date not found  Next physical within 3 mo: Visit date not found  Prescriber OR PCP: Sanjana Mike MD  Last diagnosis associated with med order: 1. Low bone mass  - alendronate (FOSAMAX) 70 MG tablet [Pharmacy Med Name: ALENDRONATE NA 70 MG TAB## 70 Tablet]; TAKE 1 TABLET (70 MG TOTAL) BY MOUTH EVERY 7 DAYS. TAKE IN THE MORNING ON AN EMPTY STOMACH WITH A FULL GLASS OF WATER 30 MINUTES BEFOR FOOD  Dispense: 12 tablet; Refill: 1    2. Atrophic vaginitis  - estradioL (VAGIFEM) 10 mcg vaginal tablet [Pharmacy Med Name: ESTRADIOL 10MCG VAGINAL TAB 10 Tablet]; INSERT 1 TABLET (10 MCG TOTAL) INTO THE VAGINA 3 TIMES A WEEK.  Dispense: 36 tablet; Refill: 11     If protocol passes may refill for 3 months.

## 2021-06-17 NOTE — PROGRESS NOTES
[facility] Follow Up Note    Assessment/Plan:  1. Light headedness  Very sporadic and intermittent.  Almost vertiginous like.  Some note of congestion.  Asymptomatic today.  Recommendation: We will check general chemistries to rule out a metabolic aberration.  Encourage fluids.  She may begin an antihistamine such as meclizine.  Follow-up for comprehensive physical in the next couple of months.  - Basic Metabolic Panel    2. Vitamin D deficiency  We will update labs  - Vitamin D, Total (25-Hydroxy)    3. Anxiety  Stable on current medications  - sertraline (ZOLOFT) 100 MG tablet; Take 1 tablet (100 mg total) by mouth daily.  Dispense: 90 tablet; Refill: 3    4. Screen for colon cancer  Discussed health maintenance and screening for colon cancer.  She has  Marked difficulty tolerating colonoscopy prep.  Will proceed with COLO-CLYDE    5. Atrophic vaginitis  Meds renewed  - estradiol (VAGIFEM) 10 mcg Tab; Insert 1 tablet (10 mcg total) into the vagina 3 (three) times a week.  Dispense: 12 tablet; Refill: 11    6. Preventative health care  Have gone through health [facility].  Recommend new shingles vaccine.  Will update colon cancer screening and mammography.  She is due for a Pap  - Lipid Cascade FASTING  - Mammo Screening Bilateral; Future  - Cologuard    7. Low bone mass  Due for bone densitometry  - DXA Bone Density Scan; Future      Follow-up in the next couple months for a physical    Sanjana Mike MD    Chief Complaint:  Chief Complaint   Patient presents with     Follow-up       History of Present Illness:  Addie is a 62 y.o. female who is here today to have a medication renewal.  She was last here in 2016.  She describes a busy year with musculoskeletal issues.  She was evaluated by Dr. Anita Pierre at [facility] for a variety of musculoskeletal maladies.  She had ITB band inflammation, some specific joint inflammation as well.  She received injections and is feeling better.   Of note, they did a comprehensive battery of tests that are reviewed with her in the chart.    Overall, she is doing well.  We have reviewed health maintenance with her today.    Additionally, her review of systems is significant for occasional lightheadedness.  This occurs very infrequently and sporadically.  She believes this may be secondary to hydration.  It may have a vertiginous quality to it.  She states she does note some upper respiratory congestion.  She denies syncope.    Review of Systems:  A comprehensive review of systems was performed and was otherwise negative    PFSH:  Social History: She is .  History   Smoking Status     Former Smoker     Quit date: 2/20/1975   Smokeless Tobacco     Never Used       Past History: She is relatively healthy.  Current Outpatient Prescriptions   Medication Sig Dispense Refill     aspirin 81 MG EC tablet Take 81 mg by mouth every other day.       estradiol (VAGIFEM) 10 mcg Tab Insert 1 tablet (10 mcg total) into the vagina 3 (three) times a week. 12 tablet 11     MULTIVITAMIN ORAL Take 1 tablet by mouth daily.        sertraline (ZOLOFT) 100 MG tablet Take 1 tablet (100 mg total) by mouth daily. 90 tablet 3     No current facility-administered medications for this visit.        Family History: Nothing new    Physical Exam:  General Appearance:   Pleasant and well-appearing and in no acute distress.  Vital signs are stable  Vitals:    04/20/18 0817   BP: 112/72   Patient Site: Left Arm   Patient Position: Sitting   Cuff Size: Adult Regular   Pulse: 66   Weight: 134 lb (60.8 kg)     Wt Readings from Last 3 Encounters:   04/20/18 134 lb (60.8 kg)   08/31/16 128 lb (58.1 kg)   02/20/15 116 lb 4.8 oz (52.8 kg)     Body mass index is 25.32 kg/(m^2).

## 2021-06-17 NOTE — PROGRESS NOTES
Addie is a 65 y.o. female contacting the clinic today via video, who will use the platform: FRUCT for the visit.  Phone # for Dox[employer]ity, or if Amwell drops:   Telephone Information:   Mobile [phone]     Advised to have pill bottles ready and pen for instructions?  Yes    ASSESSMENT:    1. Left knee pain, unspecified chronicity  Normal x-ray.  Occurs with hiking on uneven pavement.  She would benefit from physical therapy.  She would like to see a sports medicine doctor.  - Ambulatory referral to Orthopedics        Preventive Health Care:      PLAN:  Patient Instructions   Addie, please make an appointment at [facility] to see either Dr. Anita Forde , Chary Allen or Yuliya Chan.  These are all excellent physical medicine physicians who will help you get right on track.    No follow-ups on file.      CHIEF COMPLAINT:  Chief Complaint   Patient presents with     Follow-up     left knee, see CallMD message from 21       HISTORY OF PRESENT ILLNESS:  Addie is a 65 y.o. female contacting the clinic today via video for advice regarding a physical medicine doctor for her left knee pain.  Of note, she was seen here for physical examination.  She complained of some intermittent left knee pain and an x-ray was performed and was negative for arthritis.  She has worked hard on an anti-inflammatory diet, some chiropractics etc.  She continues to have intermittent discomfort.  It tends to occur when she is hiking on uneven surfaces.  She is wondering about a consult for orthopedic surgery.    REVIEW OF SYSTEMS:         PFSH:  Social History     Social History Narrative     Not on file       TOBACCO USE:  Social History     Tobacco Use   Smoking Status Former Smoker     Quit date: 1975     Years since quittin.2   Smokeless Tobacco Never Used       VITALS:  There were no vitals filed for this visit.  Wt Readings from Last 3 Encounters:   20 127 lb 4 oz (57.7 kg)   10/26/18 131 lb 4.8 oz  (59.6 kg)   04/20/18 134 lb (60.8 kg)       PHYSICAL EXAM:  (observations via Video)  Momentary video was obtained.  She appears well.  Knee was not examined.  Normal x-ray last visit    MEDICATIONS:   Current Outpatient Medications   Medication Sig Dispense Refill     alendronate (FOSAMAX) 70 MG tablet TAKE 1 TABLET (70 MG TOTAL) BY MOUTH EVERY 7 DAYS. TAKE IN THE MORNING ON AN EMPTY STOMACH WITH A FULL GLASS OF WATER 30 MINUTES BEFORE FOOD. 12 tablet 1     aspirin 81 MG EC tablet Take 81 mg by mouth every other day.       CALCIUM CARBONATE-VITAMIN D3 ORAL        cholecalciferol, vitamin D3, 1,000 unit (25 mcg) tablet Take 2 tablets (2,000 Units total) by mouth daily.  0     estradioL (VAGIFEM) 10 mcg vaginal tablet INSERT 1 TABLET (10 MCG TOTAL) INTO THE VAGINA 3 TIMES A WEEK. 108 tablet 2     sertraline (ZOLOFT) 100 MG tablet Take 1 tablet (100 mg total) by mouth daily. 90 tablet 3     No current facility-administered medications for this visit.        Outside Notes summarized:   Mychart messages x ,  Interim orders x   Labs, x-rays, cardiology, GI tests reviewed:   New orders:   Orders Placed This Encounter   Procedures     Ambulatory referral to Orthopedics     Referral Priority:   Routine     Referral Type:   Consultation     Referral Reason:   Evaluation and Treatment     Requested Specialty:   Orthopedics     Number of Visits Requested:   1       Independent review of:    Supplemental history by:      Video Start Time: 11: 37 AM  Video End time:  11:42 AM  Face to face plus orders: 7 minutes  Documentation time: 3 minutes    The visit lasted a total of 10 minutes     Patient has given verbal consent to a Video visit?  Yes  How would you like to obtain your AVS?  MyChart  Will anyone else be joining your video visit? No       Video-Visit Details  Type of service:  Video Visit  Originating Location (pt. Location): Home  Distant Location (provider location):   [facility]     Summer C  Fernando, CMA

## 2021-06-17 NOTE — PATIENT INSTRUCTIONS - HE
Addie, please make an appointment at [facility] to see either Dr. Anita Forde , Chary Allen or Yuliya Chan.  These are all excellent physical medicine physicians who will help you get right on track.

## 2021-06-24 NOTE — TELEPHONE ENCOUNTER
Refill Approved    Rx renewed per Medication Renewal Policy. Medication was last renewed on 4/20/18.    Luis Alfredo Anderson, [facility] Triage/Med Refill 3/7/2019     Requested Prescriptions   Pending Prescriptions Disp Refills     estradiol (VAGIFEM) 10 mcg Tab [Pharmacy Med Name: ESTRADIOL 10 MCG TAB 10 TAB] 12 tablet 11     Sig: INSERT 1 TABLET (10 MCG TOTAL) INTO THE VAGINA 3 TIMES A WEEK.    Hormone Replacement Therapy Refill Protocol Passed - 3/4/2019  3:21 PM       Passed - PCP or prescribing provider visit in past 12 months      Last office visit with prescriber/PCP: 4/20/2018 Sanjana Mike MD OR same dept: 4/20/2018 Sanjana Mike MD OR same specialty: 4/20/2018 Sanjana Mike MD  Last physical: 8/31/2016 Last MTM visit: Visit date not found     Next visit within 3 mo: Visit date not found  Next physical within 3 mo: Visit date not found  Prescriber OR PCP: Sanjana Mike MD  Last diagnosis associated with med order: 1. Atrophic vaginitis  - estradiol (VAGIFEM) 10 mcg Tab [Pharmacy Med Name: ESTRADIOL 10 MCG TAB 10 TAB]; INSERT 1 TABLET (10 MCG TOTAL) INTO THE VAGINA 3 TIMES A WEEK.  Dispense: 12 tablet; Refill: 11     If protocol passes may refill for 3 months.

## 2021-06-25 NOTE — TELEPHONE ENCOUNTER
----- Message from Pamela Behr sent at 6/10/2021 11:04 AM CDT -----  Regarding: FW: COLOGUARD DUE  Please put in the new order and then the cologuard will go right to the Cologuard to process.    Thanks.  Jaki  ----- Message -----  From: Behr, Pamela  Sent: 9/28/2020  11:17 AM CDT  To: [facility]  Subject: COLOGUARD DUE                                    COLOGUARD DUE MAY 3, 2021.    PLEASE PUT ORDER IN PORTAL THEN

## 2021-06-25 NOTE — TELEPHONE ENCOUNTER
Refill Approved    Rx renewed per Medication Renewal Policy. Medication was last renewed on 11/19/20.    Prashanth Dietrich, [facility] Triage/Med Refill 6/3/2021     Requested Prescriptions   Pending Prescriptions Disp Refills     alendronate (FOSAMAX) 70 MG tablet [Pharmacy Med Name: ALENDRONATE NA 70 MG TABLET 70 Tablet] 12 tablet 11     Sig: TAKE 1 TABLET (70 MG TOTAL) BY MOUTH EVERY 7 DAYS. TAKE IN THE MORNING ON AN EMPTY STOMACH WITH A FULL GLASS OF WATER 30 MINUTES BEFOR FOOD       Biphosphonates Refill Protocol Passed - 6/2/2021 11:34 AM        Passed - PCP or prescribing provider visit in last 12 months     Last office visit with prescriber/PCP: 4/20/2018 Sanjana Mike MD OR same dept: Visit date not found OR same specialty: 4/20/2018 Sanjana Mike MD  Last physical: 9/25/2020 Last MTM visit: Visit date not found   Next visit within 3 mo: Visit date not found  Next physical within 3 mo: Visit date not found  Prescriber OR PCP: Sanjana Mike MD  Last diagnosis associated with med order: 1. Low bone mass  - alendronate (FOSAMAX) 70 MG tablet [Pharmacy Med Name: ALENDRONATE NA 70 MG TABLET 70 Tablet]; TAKE 1 TABLET (70 MG TOTAL) BY MOUTH EVERY 7 DAYS. TAKE IN THE MORNING ON AN EMPTY STOMACH WITH A FULL GLASS OF WATER 30 MINUTES BEFOR FOOD  Dispense: 12 tablet; Refill: 11    If protocol passes may refill for 12 months if within 3 months of last provider visit (or a total of 15 months).             Passed - Serum creatinine in last 12 months     Creatinine   Date Value Ref Range Status   07/08/2020 0.79 0.60 - 1.10 mg/dL Final

## 2021-06-25 NOTE — PROGRESS NOTES
This encounter was created solely for the purpose of releasing the future order that was placed for Cologuard.  This is a necessary step in order for the results to be abstracted once they are available.    Lisa Morse

## 2021-06-26 NOTE — PROGRESS NOTES
Progress Notes by Hernan Mooney PA-C at 10/26/2018  3:20 PM     Author: Hernan Mooney PA-C Service: -- Author Type: Physician Assistant    Filed: 10/26/2018  4:31 PM Encounter Date: 10/26/2018 Status: Signed    : Hernan Mooney PA-C (Physician Assistant)       Subjective:      Patient ID: Addie Carbone is a 62 y.o. female.    Chief Complaint:    HPI  Addie Carbone is a 62 y.o. female who presents today complaining of vulvar vaginal itching over the last 5 days.  She has not noticed any lesions or redness on the vulvar area but it has been itchy.  She has not had overt vaginal discharge but it has been itchy into the vaginal vault.  No pelvic pain no dyspareunia and no noted urinary symptoms to include urinary hesitancy urgency frequency dysuria back or flank pain and no gross hematuria.  She does use a vaginal estrogen pill for maintaining the vaginal tissues.    She is  and only has one sexual partner and she declines need for STI screening since she has no concern for STIs.      No past medical history on file.    Past Surgical History:   Procedure Laterality Date   ? GA  DELIVERY ONLY      Description:  Section;  Recorded: 2010;  Comments: x2   ? GA LIGATE FALLOPIAN TUBE      Description: Tubal Ligation;  Recorded: 2010;       Family History   Problem Relation Age of Onset   ? COPD Mother    ? Heart disease Mother    ? Esophageal cancer Father    ? Heart disease Father    ? Hyperlipidemia Brother    ? Hypertension Brother    ? Heart disease Brother    ? Thyroid nodules Daughter    ? Depression Daughter        Social History   Substance Use Topics   ? Smoking status: Former Smoker     Quit date: 1975   ? Smokeless tobacco: Never Used   ? Alcohol use None       Review of Systems  As above in HPI, otherwise negative.    Objective:     /70 (Patient Site: Right Arm, Patient Position: Sitting, Cuff Size: Adult Regular)  Pulse 68  Temp 97.5  F (36.4  C) (Oral)   Resp  16  Wt 131 lb 4.8 oz (59.6 kg)  SpO2 98%  BMI 24.81 kg/m2    Physical Exam  General: Patient is resting comfortably no acute distress is afebrile  HEENT: Head is normocephalic atraumatic   eyes are PERRL EOMI sclera anicteric   LUNGS: Clear to auscultation bilaterally  HEART: Regular rate and rhythm  Skin: Without rash non-diaphoretic  Under supervised chaperoned physical exam with our clinical assistant, she was placed in the lithotomy position with speculum exam.  External genitalia Bartholin's urethra and [place]'s were inspected and within normal limits.  Speculum exam of the vaginal vault showed that there was white discharge in the vaginal vault in addition to the cervical mucus.  No noted erythema or lesions on the vulva.    Wet prep was obtained.      GC and Chlamydia cultures were NOT obtained.    Lab:  Recent Results (from the past 24 hour(s))   Urinalysis Macro & Micro   Result Value Ref Range    Color, UA Yellow Colorless, Yellow, Straw, Light Yellow    Clarity, UA Clear Clear    Glucose, UA Negative Negative    Bilirubin, UA Negative Negative    Ketones, UA Negative Negative    Specific Gravity, UA 1.015 1.005 - 1.030    Blood, UA Negative Negative    pH, UA 5.5 5.0 - 8.0    Protein, UA Negative Negative mg/dL    Urobilinogen, UA 0.2 E.U./dL 0.2 E.U./dL, 1.0 E.U./dL    Nitrite, UA Negative Negative    Leukocytes, UA Small (!) Negative    Bacteria, UA Few (!) None Seen hpf    RBC, UA 0-2 None Seen, 0-2 hpf    WBC, UA 0-5 None Seen, 0-5 hpf    Squam Epithel, UA 0-5 None Seen, 0-5 lpf   Wet Prep, Vaginal   Result Value Ref Range    Yeast Result No yeast seen No yeast seen    Trichomonas No Trichomonas seen No Trichomonas seen    Clue Cells, Wet Prep No Clue cells seen No Clue cells seen         Assessment:     Procedures    The primary encounter diagnosis was Vulvar itching. A diagnosis of Dysuria was also pertinent to this visit.    Plan:     1. Urinary tract infection without hematuria, site unspecified      2. Vulvar itching  Urinalysis Macro & Micro   3. Dysuria  Wet Prep, Vaginal       We will treat empirically for her urinary tract infection since she has slight leukocytes but no white blood cells per high-powered field that are problematic.  If she is not improving or she develops worsening of her symptoms from the antibiotic treatment which would be consistent with a yeast infection and have her return for reevaluation and treatment either with walk-in care with her primary care provider.  Questions were answered to patient's satisfaction before discharge.    Patient Instructions     Increased fluids and rest.  Discussed signs and symptoms of ascending urinary tract infection symptoms to include pyelonephritis. Instructed to turn to clinic if there are increased fever chills night sweats fatigue abdominal pain or flank pain  Antibiotic as written. Risks and benefits of medication discussed.  Indication for return to clinic.    As a result of our visit today, here are the action plans for you:    1. Medication(s) to stop: There are no discontinued medications.    2. Medication(s) to start or change:   Medications Ordered   Medications   ? cephalexin (KEFLEX) 500 MG capsule     Sig: Take 1 capsule (500 mg total) by mouth 2 (two) times a day for 10 days. For UTI.     Dispense:  20 capsule     Refill:  0       3. Other instructions: Yes      Urinary Tract Infections in Women    Urinary tract infections (UTIs) are most often caused by bacteria (germs). These bacteria enter the urinary tract. The bacteria may come from outside the body. Or they may travel from the skin outside the rectum or vagina into the urethra. Female anatomy makes it easier for bacteria from the bowel to enter a womans urinary tract, which is the most common source of UTI. This means women develop UTIs more often than men. Pain in or around the urinary tract is a common UTI symptom. But the only way to know for sure if you have a UTI for the  health care provider to test your urine. The two tests that may be done are the urinalysis and urine culture.  Types of UTIs    Cystitis: A bladder infection (cystitis) is the most common UTI in women. You may have urgent or frequent urination. You may also have pain, burning when you urinate, and bloody urine.    Urethritis: This is an inflamed urethra, which is the tube that carries urine from the bladder to outside the body. You may have lower stomach or back pain. You may also have urgent or frequent urination.    Pyelonephritis: This is a kidney infection. If not treated, it can be serious and damage your kidneys. In severe cases, you may be hospitalized. You may have a fever and lower back pain.  Medications to treat a UTI  Most UTIs are treated with antibiotics. These kill the bacteria. The length of time you need to take them depends on the type of infection. It may be as short as 3 days. If you have repeated UTIs, a low-dose antibiotic may be needed for several months. Take antibiotics exactly as directed. Dont stop taking them until all of the medication is gone. If you stop taking the antibiotic too soon, the infection may not go away, and you may develop a resistance to the antibiotic. This can make it much harder to treat.  Lifestyle changes to treat and prevent UTIs  The lifestyle changes below will help get rid of your UTI. They may also help prevent future UTIs.    Drink plenty of fluids. This includes water, juice, or other caffeine-free drinks. Fluids help flush bacteria out of your body.    Empty your bladder. Always empty your bladder when you feel the urge to urinate. And always urinate before going to sleep. Urine that stays in your bladder can lead to infection. Try to urinate before and after sex as well.    Practice good personal hygiene. Wipe yourself from front to back after using the toilet. This helps keep bacteria from getting into the urethra.    Use condoms during sex. These help  prevent UTIs caused by sexually transmitted bacteria. Also, avoid using spermicides during sex. These can increase the risk of UTIs. Choose other forms of birth control instead. For women who tend to get UTIs after sex, a low-dose of a preventive antibiotic may be used. Be sure to discuss this option with your health care provider.    Follow up with your health care provider as directed. He or she may test to make sure the infection has cleared. If necessary, additional treatment may be started.  Date Last Reviewed: 9/8/2014 2000-2016 [employer]. [address] [place] [address]. All rights reserved. This information is not intended as a substitute for professional medical care. Always follow your healthcare professional's instructions.

## 2021-08-05 ENCOUNTER — TELEPHONE (OUTPATIENT)
Dept: INTERNAL MEDICINE | Facility: CLINIC | Age: 66
End: 2021-08-05

## 2021-08-05 NOTE — TELEPHONE ENCOUNTER
Caller stated that on the order that was sent over didn't seen to be correct for a screening if the nurse or doctor can call back to discuss more about it that would be great.   [phone] opt 2

## 2021-08-15 ENCOUNTER — HEALTH MAINTENANCE LETTER (OUTPATIENT)
Age: 66
End: 2021-08-15

## 2021-08-24 DIAGNOSIS — F41.9 ANXIETY: ICD-10-CM

## 2021-08-24 DIAGNOSIS — M85.80 LOW BONE MASS: ICD-10-CM

## 2021-08-25 ENCOUNTER — HOSPITAL ENCOUNTER (OUTPATIENT)
Dept: MAMMOGRAPHY | Facility: CLINIC | Age: 66
Discharge: HOME | End: 2021-08-25
Attending: INTERNAL MEDICINE | Admitting: INTERNAL MEDICINE
Payer: COMMERCIAL

## 2021-08-25 DIAGNOSIS — Z12.31 VISIT FOR SCREENING MAMMOGRAM: ICD-10-CM

## 2021-08-25 PROCEDURE — 77063 BREAST TOMOSYNTHESIS BI: CPT

## 2021-08-27 RX ORDER — ALENDRONATE SODIUM 70 MG/1
TABLET ORAL
Qty: 12 TABLET | Refills: 3 | Status: SHIPPED | OUTPATIENT
Start: 2021-08-27 | End: 2021-09-22

## 2021-08-27 RX ORDER — SERTRALINE HYDROCHLORIDE 100 MG/1
TABLET, FILM COATED ORAL
Qty: 90 TABLET | Refills: 3 | Status: SHIPPED | OUTPATIENT
Start: 2021-08-27 | End: 2021-09-22

## 2021-08-27 NOTE — TELEPHONE ENCOUNTER
Pending Prescriptions:                       Disp   Refills    sertraline (ZOLOFT) 100 MG tablet [Pharma*90 tab*3            Sig: TAKE 1 TABLET (100 MG TOTAL) BY MOUTH DAILY.    alendronate (FOSAMAX) 70 MG tablet [Pharm*12 tab*0            Sig: TAKE 1 TABLET (70 MG TOTAL) BY MOUTH EVERY 7           DAYS. TAKE IN THE MORNING ON AN EMPTY STOMACH           WITH A FULL GLASS OF WATER 30 MINUTES BEFOR FOOD    Patient taking sertraline to treat history of anxiety.  Patient taking alendronate for a history of low bone mass.    Patient was last seen by Dr. Mike on 5/7/21.

## 2021-09-20 ASSESSMENT — ACTIVITIES OF DAILY LIVING (ADL): CURRENT_FUNCTION: NO ASSISTANCE NEEDED

## 2021-09-22 ENCOUNTER — OFFICE VISIT (OUTPATIENT)
Dept: INTERNAL MEDICINE | Facility: CLINIC | Age: 66
End: 2021-09-22
Payer: COMMERCIAL

## 2021-09-22 ENCOUNTER — ANCILLARY PROCEDURE (OUTPATIENT)
Dept: GENERAL RADIOLOGY | Facility: CLINIC | Age: 66
End: 2021-09-22
Attending: INTERNAL MEDICINE
Payer: COMMERCIAL

## 2021-09-22 VITALS
RESPIRATION RATE: 16 BRPM | OXYGEN SATURATION: 98 % | WEIGHT: 129.5 LBS | DIASTOLIC BLOOD PRESSURE: 70 MMHG | SYSTOLIC BLOOD PRESSURE: 136 MMHG | BODY MASS INDEX: 24.45 KG/M2 | HEART RATE: 68 BPM | HEIGHT: 61 IN

## 2021-09-22 DIAGNOSIS — D86.9 SARCOIDOSIS: ICD-10-CM

## 2021-09-22 DIAGNOSIS — I73.00 RAYNAUD'S DISEASE WITHOUT GANGRENE: ICD-10-CM

## 2021-09-22 DIAGNOSIS — Z78.0 MENOPAUSE: ICD-10-CM

## 2021-09-22 DIAGNOSIS — G89.29 CHRONIC PAIN OF LEFT KNEE: ICD-10-CM

## 2021-09-22 DIAGNOSIS — R42 VERTIGO: ICD-10-CM

## 2021-09-22 DIAGNOSIS — M25.562 CHRONIC PAIN OF LEFT KNEE: ICD-10-CM

## 2021-09-22 DIAGNOSIS — Z82.0 FAMILY HISTORY OF MS (MULTIPLE SCLEROSIS): ICD-10-CM

## 2021-09-22 DIAGNOSIS — M79.645 THUMB PAIN, LEFT: ICD-10-CM

## 2021-09-22 DIAGNOSIS — F41.9 ANXIETY: ICD-10-CM

## 2021-09-22 DIAGNOSIS — M25.50 ARTHRALGIA, UNSPECIFIED JOINT: ICD-10-CM

## 2021-09-22 DIAGNOSIS — M85.80 LOW BONE MASS: ICD-10-CM

## 2021-09-22 DIAGNOSIS — R79.9 ABNORMAL FINDING OF BLOOD CHEMISTRY, UNSPECIFIED: ICD-10-CM

## 2021-09-22 DIAGNOSIS — G25.81 RESTLESS LEG SYNDROME: ICD-10-CM

## 2021-09-22 DIAGNOSIS — N95.2 ATROPHIC VAGINITIS: ICD-10-CM

## 2021-09-22 DIAGNOSIS — Z00.00 ENCOUNTER FOR PREVENTIVE CARE: Primary | ICD-10-CM

## 2021-09-22 DIAGNOSIS — Z83.2 FAMILY HISTORY OF AUTOIMMUNE DISORDER: ICD-10-CM

## 2021-09-22 LAB
ALBUMIN SERPL-MCNC: 4.1 G/DL (ref 3.5–5)
ALP SERPL-CCNC: 45 U/L (ref 45–120)
ALT SERPL W P-5'-P-CCNC: 17 U/L (ref 0–45)
ANION GAP SERPL CALCULATED.3IONS-SCNC: 11 MMOL/L (ref 5–18)
AST SERPL W P-5'-P-CCNC: 18 U/L (ref 0–40)
BASOPHILS # BLD AUTO: 0 10E3/UL (ref 0–0.2)
BASOPHILS NFR BLD AUTO: 1 %
BILIRUB SERPL-MCNC: 0.6 MG/DL (ref 0–1)
BUN SERPL-MCNC: 10 MG/DL (ref 8–22)
C REACTIVE PROTEIN LHE: 0.3 MG/DL (ref 0–0.8)
CALCIUM SERPL-MCNC: 9.5 MG/DL (ref 8.5–10.5)
CHLORIDE BLD-SCNC: 106 MMOL/L (ref 98–107)
CHOLEST SERPL-MCNC: 240 MG/DL
CO2 SERPL-SCNC: 23 MMOL/L (ref 22–31)
CREAT SERPL-MCNC: 0.75 MG/DL (ref 0.6–1.1)
EOSINOPHIL # BLD AUTO: 0.1 10E3/UL (ref 0–0.7)
EOSINOPHIL NFR BLD AUTO: 3 %
ERYTHROCYTE [DISTWIDTH] IN BLOOD BY AUTOMATED COUNT: 12.9 % (ref 10–15)
ERYTHROCYTE [SEDIMENTATION RATE] IN BLOOD BY WESTERGREN METHOD: 11 MM/HR (ref 0–20)
FASTING STATUS PATIENT QL REPORTED: YES
FERRITIN SERPL-MCNC: 150 NG/ML (ref 10–130)
GFR SERPL CREATININE-BSD FRML MDRD: 83 ML/MIN/1.73M2
GLUCOSE BLD-MCNC: 98 MG/DL (ref 70–125)
HBA1C MFR BLD: 5.5 % (ref 0–5.6)
HCT VFR BLD AUTO: 43.8 % (ref 35–47)
HDLC SERPL-MCNC: 76 MG/DL
HGB BLD-MCNC: 14.4 G/DL (ref 11.7–15.7)
IMM GRANULOCYTES # BLD: 0 10E3/UL
IMM GRANULOCYTES NFR BLD: 0 %
LDLC SERPL CALC-MCNC: 128 MG/DL
LYMPHOCYTES # BLD AUTO: 1.1 10E3/UL (ref 0.8–5.3)
LYMPHOCYTES NFR BLD AUTO: 27 %
MCH RBC QN AUTO: 30.8 PG (ref 26.5–33)
MCHC RBC AUTO-ENTMCNC: 32.9 G/DL (ref 31.5–36.5)
MCV RBC AUTO: 94 FL (ref 78–100)
MONOCYTES # BLD AUTO: 0.4 10E3/UL (ref 0–1.3)
MONOCYTES NFR BLD AUTO: 10 %
NEUTROPHILS # BLD AUTO: 2.6 10E3/UL (ref 1.6–8.3)
NEUTROPHILS NFR BLD AUTO: 60 %
PLATELET # BLD AUTO: 201 10E3/UL (ref 150–450)
POTASSIUM BLD-SCNC: 4.2 MMOL/L (ref 3.5–5)
PROT SERPL-MCNC: 6.9 G/DL (ref 6–8)
RBC # BLD AUTO: 4.68 10E6/UL (ref 3.8–5.2)
SODIUM SERPL-SCNC: 140 MMOL/L (ref 136–145)
TRIGL SERPL-MCNC: 179 MG/DL
TSH SERPL DL<=0.005 MIU/L-ACNC: 3.07 UIU/ML (ref 0.3–5)
WBC # BLD AUTO: 4.3 10E3/UL (ref 4–11)

## 2021-09-22 PROCEDURE — 90662 IIV NO PRSV INCREASED AG IM: CPT | Performed by: INTERNAL MEDICINE

## 2021-09-22 PROCEDURE — 85025 COMPLETE CBC W/AUTO DIFF WBC: CPT | Performed by: INTERNAL MEDICINE

## 2021-09-22 PROCEDURE — 86141 C-REACTIVE PROTEIN HS: CPT | Performed by: INTERNAL MEDICINE

## 2021-09-22 PROCEDURE — 84443 ASSAY THYROID STIM HORMONE: CPT | Performed by: INTERNAL MEDICINE

## 2021-09-22 PROCEDURE — 83036 HEMOGLOBIN GLYCOSYLATED A1C: CPT | Performed by: INTERNAL MEDICINE

## 2021-09-22 PROCEDURE — 82306 VITAMIN D 25 HYDROXY: CPT | Performed by: INTERNAL MEDICINE

## 2021-09-22 PROCEDURE — 80061 LIPID PANEL: CPT | Performed by: INTERNAL MEDICINE

## 2021-09-22 PROCEDURE — G0438 PPPS, INITIAL VISIT: HCPCS | Performed by: INTERNAL MEDICINE

## 2021-09-22 PROCEDURE — 85652 RBC SED RATE AUTOMATED: CPT | Performed by: INTERNAL MEDICINE

## 2021-09-22 PROCEDURE — 86200 CCP ANTIBODY: CPT | Performed by: INTERNAL MEDICINE

## 2021-09-22 PROCEDURE — 82728 ASSAY OF FERRITIN: CPT | Performed by: INTERNAL MEDICINE

## 2021-09-22 PROCEDURE — 80053 COMPREHEN METABOLIC PANEL: CPT | Performed by: INTERNAL MEDICINE

## 2021-09-22 PROCEDURE — 99213 OFFICE O/P EST LOW 20 MIN: CPT | Mod: 25 | Performed by: INTERNAL MEDICINE

## 2021-09-22 PROCEDURE — 36415 COLL VENOUS BLD VENIPUNCTURE: CPT | Performed by: INTERNAL MEDICINE

## 2021-09-22 PROCEDURE — G0008 ADMIN INFLUENZA VIRUS VAC: HCPCS | Performed by: INTERNAL MEDICINE

## 2021-09-22 PROCEDURE — 73140 X-RAY EXAM OF FINGER(S): CPT | Mod: TC | Performed by: RADIOLOGY

## 2021-09-22 RX ORDER — ESTRADIOL 10 UG/1
INSERT VAGINAL
Qty: 108 TABLET | Refills: 3 | Status: SHIPPED | OUTPATIENT
Start: 2021-09-22 | End: 2022-10-27

## 2021-09-22 RX ORDER — SERTRALINE HYDROCHLORIDE 100 MG/1
TABLET, FILM COATED ORAL
Qty: 90 TABLET | Refills: 3 | Status: SHIPPED | OUTPATIENT
Start: 2021-09-22 | End: 2022-10-27

## 2021-09-22 RX ORDER — ALENDRONATE SODIUM 70 MG/1
TABLET ORAL
Qty: 12 TABLET | Refills: 3 | Status: SHIPPED | OUTPATIENT
Start: 2021-09-22 | End: 2022-10-27

## 2021-09-22 ASSESSMENT — MIFFLIN-ST. JEOR: SCORE: 1060.82

## 2021-09-22 ASSESSMENT — ACTIVITIES OF DAILY LIVING (ADL): CURRENT_FUNCTION: NO ASSISTANCE NEEDED

## 2021-09-22 NOTE — PROGRESS NOTES
ANNUAL WELLNESS VISIT    Assessment and Plan:     DX: 1. Encounter for preventive care  We will update labs. Mammography, colon cancer screening, ophthalmologic, dental and Derm care are up-to-date.  She is physically active and maintains a normal BMI.    - Comprehensive metabolic panel; Future  - Hemoglobin A1c; Future  - Lipid panel reflex to direct LDL Fasting; Future    2. Low bone mass with elevated FRAX/osteoporosis equivalent  Continue current medications. She is due for an update on bone density. We will ask her to switch back to [facility] as she had one bone density at [facility] that is hard to interpret. We will continue the same and check a vitamin D level  - alendronate (FOSAMAX) 70 MG tablet; TAKE 1 TABLET (70 MG TOTAL) BY MOUTH EVERY 7 DAYS. TAKE IN THE MORNING ON AN EMPTY STOMACH WITH A FULL GLASS OF WATER 30 MINUTES BEFOR FOOD  Dispense: 12 tablet; Refill: 3  - DX Hip/Pelvis/Spine; Future  - Vitamin D Deficiency; Future    3. Anxiety  Stable. We will continue current medications and check a thyroid  - sertraline (ZOLOFT) 100 MG tablet; TAKE 1 TABLET (100 MG TOTAL) BY MOUTH DAILY.  Dispense: 90 tablet; Refill: 3  - TSH with free T4 reflex; Future    4. Atrophic vaginitis  Medicine ordered  - estradiol (VAGIFEM) 10 MCG TABS vaginal tablet; INSERT 1 TABLET (10 MCG TOTAL) INTO THE VAGINA 3 TIMES A WEEK.  Dispense: 108 tablet; Refill: 3    5. Chronic pain of left knee  Despite unremarkable MRI and x-ray of the knee, acute episodic burning of the left knee with severe pain.  Following with Dr. Yuliya Chan-Douglas orthopedic surgery.    6. Arthralgia, unspecified joint  Extensive discussion was had today regarding intermittent arthralgias of different joint groups. Mostly left knee and MC P joint of the left hand. Underlying family history of connective tissue disorder and daughter and brother who have not only celiac disease but ulcerative proctitis and Crohn's disease.  Possibly age-related  osteoarthritis-early or mild. May also have some element of underlying connective tissue. She also worries about the family history of progressive MS. This can be genetically linked and she worries that her intermittent muscle weakness may be causing joint aches/etc.  Labs as below  - TSH with free T4 reflex; Future  - Cyclic Citrullinated Peptide Antibody IgG; Future  - CRP inflammation; Future  - NEIL Screen; Future  - Erythrocyte sedimentation rate auto; Future  - Ferritin; Future    7. Restless leg syndrome  Noted-we will check ferritin    8. Vertigo  We will check MRI of the brain as MS is prevalent in her family  - Comprehensive metabolic panel; Future  - CBC with Platelets & Differential; Future  - Hemoglobin A1c; Future  - MR Brain w/o & w Contrast; Future    9. Raynaud's disease without gangrene  Evaluated at [place]    10. Family history of MS (multiple sclerosis)  Brother with progressive MS to be started on October of this. With her nonspecific symptoms of intermittent muscle weakness, intermittent burning joints, intermittent vertigo, etc., she is worried. Primary progressive MS has a genetic link.  - MR Brain w/o & w Contrast; Future    11. Family history of autoimmune disorder      12. Menopause  Due for bone density  - DX Hip/Pelvis/Spine; Future    13. Abnormal finding of blood chemistry, unspecified     - Hemoglobin A1c; Future    14. Sarcoidosis      15. Thumb pain, left  Intermittent burning of the left MCP joints  - XR Finger Left G/E 2 Views       Follow-up in 2 to 3 months       The patient's current medical problems were reviewed.    Advanced directive up-to-date        The following health maintenance items are reviewed in Epic and correct as of today:  Health Maintenance Due   Topic Date Due     ANNUAL REVIEW OF  ORDERS  Never done     DEPRESSION ACTION PLAN  Never done     INFLUENZA VACCINE (1) 09/01/2021     MEDICARE ANNUAL WELLNESS VISIT  09/25/2021       Appropriate preventive services  were discussed with this patient, including applicable screening as appropriate for cardiovascular disease, diabetes, osteopenia/osteoporosis, and glaucoma.  As appropriate for age/gender, discussed screening for colorectal cancer, prostate cancer, breast cancer, and cervical cancer. Checklist reviewing preventive services available has been given to the patient.    Reviewed patients plan of care and provided an AVS. The Basic Care Plan for Addie meets the Care Plan requirement. This Care Plan has been established and reviewed with the Patient, and printed in the AVS.    The following health maintenance schedule was reviewed with the patient and provided in printed form in the after visit summary:   Health Maintenance   Topic Date Due     ANNUAL REVIEW OF HM ORDERS  Never done     DEPRESSION ACTION PLAN  Never done     INFLUENZA VACCINE (1) 09/01/2021     MEDICARE ANNUAL WELLNESS VISIT  09/25/2021     PHQ-9  11/07/2021     FALL RISK ASSESSMENT  05/07/2022     MAMMO SCREENING  08/25/2023     COLORECTAL CANCER SCREENING  06/15/2024     LIPID  07/08/2025     ADVANCE CARE PLANNING  09/25/2025     DTAP/TDAP/TD IMMUNIZATION (3 - Td or Tdap) 06/25/2029     DEXA  02/25/2030     HEPATITIS C SCREENING  Completed     Pneumococcal Vaccine: 65+ Years  Completed     ZOSTER IMMUNIZATION  Completed     COVID-19 Vaccine  Completed     IPV IMMUNIZATION  Aged Out     MENINGITIS IMMUNIZATION  Aged Out     HEPATITIS B IMMUNIZATION  Aged Out        Subjective:   Addie is a 65 year old female who presents to the clinic today for an Annual Wellness Visit.    CHIEF COMPLAINT:  Chief Complaint   Patient presents with     Wellness Visit       HPI: Addie is a delightful 65-year-old female who is here today for a wellness visit. She has a very detailed list of concerns. First, the family history is updated and her daughter who is 35 was diagnosed with celiac disease/premature ovarian failure and ulcerative proctitis. Her brother was also  diagnosed with Crohn's disease and underwent a small bowel resection that was partially cancerous. Another brother was diagnosed with primary progressive multiple sclerosis. This has a genetic component and she is worried.    She has reflected on her history over the years. She has noted intermittent vertigo. She has had some issues with significant knee pain and was referred to Dr. Chan for this. She states her x-rays and MRI have been normal. She was told by her physical therapist that the weakened muscles contribute to the pain. She underwent some therapy that helped but did not entirely ameliorate the problem. Also, she states that she had a tremor in her thumb-left MCP joint. She states this improved with strengthening and physical therapy. However, she gets burning in the left MCP joint as well. 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 With all of the joint achiness and burning, she does not see any swelling or active inflammation. She does not see any joint effusions.    Health maintenance is reviewed and updated in the chart    Review of Systems:    A comprehensive review was performed and is negative    Patient Care Team:  Sanjana Mike MD as PCP - General (Internal Medicine)   Dr. Yuliya Chan-Oglesby orthopedic surgery    Patient Active Problem List   Diagnosis     Basal Cell Carcinoma Of The Skin     Vitamin D Deficiency     Dysthymic Disorder     Mammogram - Abnormal     Sarcoidosis     Hemorrhoids     No past medical history on file.   Past Surgical History:   Procedure Laterality Date     C  DELIVERY ONLY      Description:  Section;  Recorded: 2010;  Comments: x2     C LIGATE FALLOPIAN TUBE      Description: Tubal Ligation;  Recorded: 2010;      Family History   Problem Relation Age of Onset     Chronic Obstructive Pulmonary Disease Mother      Heart Disease Mother      Esophageal Cancer Father      Heart Disease Father      Hyperlipidemia Brother       Hypertension Brother      Heart Disease Brother      Thyroid nodules Daughter      Depression Daughter       Social History     Socioeconomic History     Marital status:      Spouse name: Not on file     Number of children: Not on file     Years of education: Not on file     Highest education level: Not on file   Occupational History     Not on file   Tobacco Use     Smoking status: Former Smoker     Quit date: 1975     Years since quittin.6     Smokeless tobacco: Never Used   Substance and Sexual Activity     Alcohol use: Not on file     Drug use: Not on file     Sexual activity: Not on file   Other Topics Concern     Not on file   Social History Narrative     Not on file     Social Determinants of Health     Financial Resource Strain:      Difficulty of Paying Living Expenses:    Food Insecurity:      Worried About Running Out of Food in the Last Year:      Ran Out of Food in the Last Year:    Transportation Needs:      Lack of Transportation (Medical):      Lack of Transportation (Non-Medical):    Physical Activity:      Days of Exercise per Week:      Minutes of Exercise per Session:    Stress:      Feeling of Stress :    Social Connections:      Frequency of Communication with Friends and Family:      Frequency of Social Gatherings with Friends and Family:      Attends Confucianism Services:      Active Member of Clubs or Organizations:      Attends Club or Organization Meetings:      Marital Status:    Intimate Partner Violence:      Fear of Current or Ex-Partner:      Emotionally Abused:      Physically Abused:      Sexually Abused:       Social History     Social History Narrative     Not on file       Current Outpatient Medications   Medication Sig Dispense Refill     alendronate (FOSAMAX) 70 MG tablet TAKE 1 TABLET (70 MG TOTAL) BY MOUTH EVERY 7 DAYS. TAKE IN THE MORNING ON AN EMPTY STOMACH WITH A FULL GLASS OF WATER 30 MINUTES BEFOR FOOD 12 tablet 3     aspirin 81 MG EC tablet [ASPIRIN  "81 MG EC TABLET] Take 81 mg by mouth every other day.       estradioL (VAGIFEM) 10 mcg vaginal tablet [ESTRADIOL (VAGIFEM) 10 MCG VAGINAL TABLET] INSERT 1 TABLET (10 MCG TOTAL) INTO THE VAGINA 3 TIMES A WEEK. 108 tablet 2     sertraline (ZOLOFT) 100 MG tablet TAKE 1 TABLET (100 MG TOTAL) BY MOUTH DAILY. 90 tablet 3     UNABLE TO FIND Reacted Juwan-Mag (Calcium 200, Phosphous 40mg Magnesium 175mg)       Vitamin D-Vitamin K (VITAMIN K2-VITAMIN D3 PO) (Vit D 5000iu, Vit K 45mcg)       CALCIUM CARBONATE-VITAMIN D3 ORAL [CALCIUM CARBONATE-VITAMIN D3 ORAL]        cholecalciferol, vitamin D3, 1,000 unit (25 mcg) tablet [CHOLECALCIFEROL, VITAMIN D3, 1,000 UNIT (25 MCG) TABLET] Take 2 tablets (2,000 Units total) by mouth daily.  0      Objective:   /70 (BP Location: Left arm, Patient Position: Sitting, Cuff Size: Adult Regular)   Pulse 68   Resp 16   Ht 1.543 m (5' 0.75\")   Wt 58.7 kg (129 lb 8 oz)   SpO2 98%   BMI 24.67 kg/m   Estimated body mass index is 24.67 kg/m  as calculated from the following:    Height as of this encounter: 1.543 m (5' 0.75\").    Weight as of this encounter: 58.7 kg (129 lb 8 oz).    VisionScreening:  No exam data present     PHYSICAL EXAM   EYES: Eyelids, conjunctiva, and sclera were normal. Pupils were normal. Cornea, iris, and lens were normal bilaterally.  HEAD, EARS, NOSE, MOUTH, AND THROAT: Head and face were normal. Hearing was normal to voice and the ears were normal to external exam. Nose appearance was normal and there was no discharge. Oropharynx was normal.  TMs were normal.  NECK: Neck appearance was normal. There were no neck masses and the thyroid was not enlarged.  RESPIRATORY: Breathing pattern was normal and the chest moved symmetrically.   Lung sounds were equal bilaterally.  CARDIOVASCULAR: Heart rate and rhythm were normal.  S1 and S2 were normal and there were no extra sounds or murmurs. Peripheral pulses in arms and legs were normal.  Jugular venous pressure was " normal.  There was no peripheral edema.  GASTROINTESTINAL: The abdomen was normal in contour.  Bowel sounds were present.   Palpation detected no tenderness, mass, or enlarged organs.   MUSCULOSKELETAL: Skeletal configuration was normal and muscle mass was normal for age. Joint appearance was overall normal.  LYMPHATIC: There were no enlarged nodes.  SKIN/HAIR/NAILS: Skin color was normal.  There were no abnormal skin lesions.  Hair and nails were normal.  NEUROLOGIC: The patient was alert and oriented to person, place, time, and circumstance. Speech was normal. Cranial nerves were normal. Motor strength was normal for age. The patient was normally coordinated.  PSYCHIATRIC:  Mood and affect were normal and the patient had normal recent and remote memory. The patient's judgment and insight were normal.              No flowsheet data found.  Cognitive Screening   1) Repeat 3 items (Leader, Season, Table)    2) Clock draw: NORMAL  3) 3 item recall: Recalls 2 objects   Results: NORMAL clock, 1-2 items recalled: COGNITIVE IMPAIRMENT LESS LIKELY    Mini-CogTM Copyright S Bryon. Licensed by the author for use in [facility]; reprinted with permission ([email or web address]). All rights reserved.    A Mini-Cog score of 0-2 suggests the possibility of dementia, score of 3-5 suggests no dementia    ROOMING STAFF:    Patient has been advised of split billing requirements and indicates understanding: Yes   Are you in the first 12 months of your Medicare coverage?  No    Do you feel safe in your environment? Yes    Have you ever done Advance Care Planning? (For example, a Health Directive, POLST, or a discussion with a medical provider or your loved ones about your wishes): Yes, advance care planning is on file.       Fall risk  Fallen 2 or more times in the past year?: No  Any fall with injury in the past year?: No      Healthy Habits:     In general, how would you rate your overall health?  Good    Frequency of exercise:   "2-3 days/week    Duration of exercise:  15-30 minutes    Do you usually eat at least 4 servings of fruit and vegetables a day, include whole grains    & fiber and avoid regularly eating high fat or \"junk\" foods?  Yes    Taking medications regularly:  No    Medication side effects:  None    Ability to successfully perform activities of daily living:  No assistance needed    Home Safety:  Lack of grab bars in the bathroom    Hearing Impairment:  Difficulty following a conversation in a noisy restaurant or crowded room    In the past 6 months, have you been bothered by leaking of urine?  No    In general, how would you rate your overall mental or emotional health?  Good      PHQ-2 Total Score: 0    Additional concerns today:  Yes      Do you have sleep apnea, excessive snoring or daytime drowsiness?: no  The visit lasted a total of 50 minutes     Reviewed and updated as needed this visit by clinical staff  Tobacco  Allergies  Meds              Sanjana Mike MD  [facility]  "

## 2021-09-23 LAB
CCP AB SER IA-ACNC: 0.6 U/ML
DEPRECATED CALCIDIOL+CALCIFEROL SERPL-MC: 61 UG/L (ref 30–80)

## 2021-10-14 ENCOUNTER — ANCILLARY PROCEDURE (OUTPATIENT)
Dept: BONE DENSITY | Facility: CLINIC | Age: 66
End: 2021-10-14
Attending: INTERNAL MEDICINE
Payer: COMMERCIAL

## 2021-10-14 DIAGNOSIS — M85.80 LOW BONE MASS: ICD-10-CM

## 2021-10-14 DIAGNOSIS — Z78.0 MENOPAUSE: ICD-10-CM

## 2021-10-14 PROCEDURE — 77080 DXA BONE DENSITY AXIAL: CPT | Performed by: INTERNAL MEDICINE

## 2021-10-15 ENCOUNTER — TELEPHONE (OUTPATIENT)
Dept: INTERNAL MEDICINE | Facility: CLINIC | Age: 66
End: 2021-10-15
Payer: COMMERCIAL

## 2021-10-15 NOTE — TELEPHONE ENCOUNTER
Pt is calling in and stating that Dr Mike needs to do the peer to peer today before 6pm.    If the peer to peer can not be done then Amy needs to be told.  Also the phone number is needed for peer to peer to be done as well .  Amy leaves at 3pm today.  [facility]     [phone]     [phone]   Option 1  Case # [id number]    Per Dr Siegel said another physician is already scheduled for today. 10/14/ no note    Pt is going to call Amy back

## 2021-10-15 NOTE — TELEPHONE ENCOUNTER
Amy is trying to get brain MRI covered by insurance.  Addie is scheduled to have this done on 10/18/21.  Amy needs Dr. Mike to call back. A peer to peer conversation needs to be done today.  Call Amy around 1:30.

## 2021-10-18 ENCOUNTER — HOSPITAL ENCOUNTER (OUTPATIENT)
Dept: MRI IMAGING | Facility: CLINIC | Age: 66
Discharge: HOME | End: 2021-10-18
Attending: INTERNAL MEDICINE | Admitting: INTERNAL MEDICINE
Payer: COMMERCIAL

## 2021-10-18 DIAGNOSIS — R42 VERTIGO: ICD-10-CM

## 2021-10-18 DIAGNOSIS — Z82.0 FAMILY HISTORY OF MS (MULTIPLE SCLEROSIS): ICD-10-CM

## 2021-10-18 PROCEDURE — 70551 MRI BRAIN STEM W/O DYE: CPT

## 2021-12-10 ENCOUNTER — HOSPITAL ENCOUNTER (OUTPATIENT)
Dept: MRI IMAGING | Facility: CLINIC | Age: 66
Discharge: HOME | End: 2021-12-10
Attending: INTERNAL MEDICINE | Admitting: INTERNAL MEDICINE
Payer: COMMERCIAL

## 2021-12-10 DIAGNOSIS — R42 INTERMITTENT VERTIGO: ICD-10-CM

## 2021-12-10 DIAGNOSIS — Z82.0 FAMILY HISTORY OF MS (MULTIPLE SCLEROSIS): ICD-10-CM

## 2021-12-10 PROCEDURE — 72141 MRI NECK SPINE W/O DYE: CPT

## 2022-02-03 ENCOUNTER — OFFICE VISIT (OUTPATIENT)
Dept: FAMILY MEDICINE | Facility: CLINIC | Age: 67
End: 2022-02-03
Payer: COMMERCIAL

## 2022-02-03 VITALS
DIASTOLIC BLOOD PRESSURE: 70 MMHG | BODY MASS INDEX: 24.66 KG/M2 | WEIGHT: 129.44 LBS | OXYGEN SATURATION: 97 % | SYSTOLIC BLOOD PRESSURE: 100 MMHG | HEART RATE: 57 BPM

## 2022-02-03 DIAGNOSIS — L30.9 DERMATITIS: Primary | ICD-10-CM

## 2022-02-03 PROCEDURE — 99213 OFFICE O/P EST LOW 20 MIN: CPT | Performed by: FAMILY MEDICINE

## 2022-02-03 RX ORDER — TRIAMCINOLONE ACETONIDE 1 MG/G
CREAM TOPICAL 2 TIMES DAILY
Qty: 80 G | Refills: 0 | Status: SHIPPED | OUTPATIENT
Start: 2022-02-03 | End: 2023-11-13

## 2022-02-03 NOTE — PROGRESS NOTES
Addie was seen today for derm problem.    Diagnoses and all orders for this visit:    Dermatitis  -     triamcinolone (KENALOG) 0.1 % external cream; Apply topically 2 times daily  Patch of raised scattered papules on low back and the base of the occiput.  No clear contact other than perhaps latex from waistband.  Will start on steroid BID.  Discussed instructions for use, potential side effects, and expected outcomes.  If symptoms worsen or do not improve, will refer to dermatology.        Subjective   Addie is a 66 year old who presents for the following health issues     HPI     Rash for a couple of months starting at the back of the head 2 months ago.  Had a patch on left arm.  Little red bumps that resolved.  Then a patch on the low back started a week later and it is still present.  It is small red bumps concentrated at the small of her back.  Tried hydrocortisone and lotrimin.  No new lotions/creams.  Takes showers.  No loofah.  No travel prior to this starting.  No new bedding.  Has a history of nickel allergy.  It is quite itchy.      Patient Active Problem List    Diagnosis Date Noted     Vitamin D Deficiency      Priority: Medium     Created by Conversion  Replacement Utility updated for latest IMO load         Mammogram - Abnormal      Priority: Medium     Created by Conversion  Replacement Utility updated for latest IMO load         Hemorrhoids      Priority: Medium     Created by Conversion  Replacement Utility updated for latest IMO load         Basal Cell Carcinoma Of The Skin      Priority: Medium     Created by Conversion         Dysthymic Disorder      Priority: Medium     Created by Conversion         Sarcoidosis      Priority: Medium     Created by Conversion         Current Outpatient Medications   Medication Instructions     alendronate (FOSAMAX) 70 MG tablet TAKE 1 TABLET (70 MG TOTAL) BY MOUTH EVERY 7 DAYS. TAKE IN THE MORNING ON AN EMPTY STOMACH WITH A FULL GLASS OF WATER 30 MINUTES BEFOR  FOOD     aspirin (ASA) 81 mg, EVERY OTHER DAY     estradiol (VAGIFEM) 10 MCG TABS vaginal tablet INSERT 1 TABLET (10 MCG TOTAL) INTO THE VAGINA 3 TIMES A WEEK.     sertraline (ZOLOFT) 100 MG tablet TAKE 1 TABLET (100 MG TOTAL) BY MOUTH DAILY.     triamcinolone (KENALOG) 0.1 % external cream Topical, 2 TIMES DAILY     UNABLE TO FIND Reacted Juwan-Mag (Calcium 200, Phosphous 40mg Magnesium 175mg)      Vitamin D-Vitamin K (VITAMIN K2-VITAMIN D3 PO) Oral, (Vit D 5000iu, Vit K 45mcg)              Objective    /70   Pulse 57   Wt 58.7 kg (129 lb 7 oz)   SpO2 97%   BMI 24.66 kg/m    Body mass index is 24.66 kg/m .  Physical Exam   Gen : Patient is alert and oriented, NAD  Derm:   Scalp: Patch of non blanching erythema at the base of occiput with few scattered papules.  Low back: large patch of scattered erythematous papules with signs of excoriation at the base of the back.

## 2022-04-29 ENCOUNTER — TRANSFERRED RECORDS (OUTPATIENT)
Dept: HEALTH INFORMATION MANAGEMENT | Facility: CLINIC | Age: 67
End: 2022-04-29
Payer: COMMERCIAL

## 2022-05-04 DIAGNOSIS — M85.80 LOW BONE MASS: ICD-10-CM

## 2022-05-08 RX ORDER — ALENDRONATE SODIUM 70 MG/1
TABLET ORAL
Qty: 12 TABLET | Refills: 3 | OUTPATIENT
Start: 2022-05-08

## 2022-05-12 ENCOUNTER — TRANSFERRED RECORDS (OUTPATIENT)
Dept: HEALTH INFORMATION MANAGEMENT | Facility: CLINIC | Age: 67
End: 2022-05-12
Payer: COMMERCIAL

## 2022-08-08 ENCOUNTER — TRANSFERRED RECORDS (OUTPATIENT)
Dept: INTERNAL MEDICINE | Facility: CLINIC | Age: 67
End: 2022-08-08

## 2022-08-09 NOTE — TELEPHONE ENCOUNTER
Chief complaint:  Patient with anxiety, depression, posttraumatic stress and disrupted sleep     Current status:  The patient notes she continues to feel anxious and depressed about her homeless status.  She took a dose of p.r.n. clonazepam yesterday afternoon after hearing from her [facility] that she would not be accepted to  [facility] in [place] until a reassessment was done.  She would have to wait for this until 30 days after the last assessment.  She notes the only other shelter in [place] is under repair and is closed.  She continues to have cold symptoms including congestion, sore throat and a cough.  She has been attending programming.    Mental status exam: The patient is well groomed and has a steady gait.  She is alert, oriented x3 with intact recent and remote memory for historical information.  Normal attention span.  Insight and judgment intact, patient engaged in treatment.  Speech normal rate.  Affect animated and mood depressed.  Normal rate of thoughts.  No overt suicidal thoughts.  Some negativity.      Assessment:  Primary diagnosis: Major depression recurrent moderate     Secondary diagnosis: PTSD, insomnia, learning disability     Plan:  Continue current medications, programming and discharge planning.  Staffing held this morning.   Pharm calling to check on status

## 2022-08-31 ENCOUNTER — HOSPITAL ENCOUNTER (OUTPATIENT)
Dept: MAMMOGRAPHY | Facility: CLINIC | Age: 67
Discharge: HOME | End: 2022-08-31
Attending: INTERNAL MEDICINE | Admitting: INTERNAL MEDICINE
Payer: COMMERCIAL

## 2022-08-31 DIAGNOSIS — Z12.31 VISIT FOR SCREENING MAMMOGRAM: ICD-10-CM

## 2022-08-31 PROCEDURE — 77067 SCR MAMMO BI INCL CAD: CPT

## 2022-09-24 ENCOUNTER — HEALTH MAINTENANCE LETTER (OUTPATIENT)
Age: 67
End: 2022-09-24

## 2022-10-25 DIAGNOSIS — N95.2 ATROPHIC VAGINITIS: ICD-10-CM

## 2022-10-25 DIAGNOSIS — M85.80 LOW BONE MASS: ICD-10-CM

## 2022-10-25 DIAGNOSIS — F41.9 ANXIETY: ICD-10-CM

## 2022-10-27 RX ORDER — ESTRADIOL 10 UG/1
INSERT VAGINAL
Qty: 36 TABLET | Refills: 1 | Status: SHIPPED | OUTPATIENT
Start: 2022-10-27 | End: 2022-11-09

## 2022-10-27 RX ORDER — SERTRALINE HYDROCHLORIDE 100 MG/1
TABLET, FILM COATED ORAL
Qty: 90 TABLET | Refills: 1 | Status: SHIPPED | OUTPATIENT
Start: 2022-10-27 | End: 2022-11-09

## 2022-10-27 RX ORDER — ALENDRONATE SODIUM 70 MG/1
TABLET ORAL
Qty: 12 TABLET | Refills: 0 | Status: SHIPPED | OUTPATIENT
Start: 2022-10-27 | End: 2022-11-09

## 2022-10-27 NOTE — TELEPHONE ENCOUNTER
"Last Written Prescription Date:  9/22/21  Last Fill Quantity: 90/180,  # refills: 3   Last office visit provider:  2/3/22     Requested Prescriptions   Pending Prescriptions Disp Refills     sertraline (ZOLOFT) 100 MG tablet [Pharmacy Med Name: SERTRALINE HCL 100MG TAB## 100 Tablet] 90 tablet 3     Sig: TAKE 1 TABLET (100 MG TOTAL) BY MOUTH DAILY.       SSRIs Protocol Failed - 10/25/2022  1:16 PM        Failed - Recent (12 mo) or future (30 days) visit within the authorizing provider's specialty     Patient has had an office visit with the authorizing provider or a provider within the authorizing providers department within the previous 12 mos or has a future within next 30 days. See \"Patient Info\" tab in inbasket, or \"Choose Columns\" in Meds & Orders section of the refill encounter.              Passed - Medication is active on med list        Passed - Patient is age 18 or older        Passed - No active pregnancy on record        Passed - No positive pregnancy test in last 12 months           alendronate (FOSAMAX) 70 MG tablet [Pharmacy Med Name: ALENDRONATE NA 70 MG TABLET 70 Tablet] 12 tablet 3     Sig: TAKE 1 TABLET (70 MG TOTAL) BY MOUTH EVERY 7 DAYS. TAKE IN THE MORNING ON AN EMPTY STOMACH WITH A FULL GLASS OF WATER 30 MINUTES BEFOR FOOD       Bisphosphonates Failed - 10/25/2022  1:16 PM        Failed - Recent (12 mo) or future (30 days) visit within the authorizing provider's specialty     Patient has had an office visit with the authorizing provider or a provider within the authorizing providers department within the previous 12 mos or has a future within next 30 days. See \"Patient Info\" tab in inbasket, or \"Choose Columns\" in Meds & Orders section of the refill encounter.              Failed - Normal serum creatinine on file within past 12 months     Recent Labs   Lab Test 09/22/21  1120   CR 0.75       Ok to refill medication if creatinine is low          Passed - Dexa on file within past 2 years     " "Please review last Dexa result.           Passed - Medication is active on med list        Passed - Patient is age 18 or older           estradiol (VAGIFEM) 10 MCG TABS vaginal tablet [Pharmacy Med Name: ESTRADIOL 10MCG VAGINAL TAB 10 Tablet] 36 tablet 3     Sig: INSERT 1 TABLET (10 MCG TOTAL) INTO THE VAGINA 3 TIMES A WEEK.       Hormone Replacement Therapy Failed - 10/25/2022  1:16 PM        Failed - Recent (12 mo) or future (30 days) visit within the authorizing provider's specialty     Patient has had an office visit with the authorizing provider or a provider within the authorizing providers department within the previous 12 mos or has a future within next 30 days. See \"Patient Info\" tab in inbasket, or \"Choose Columns\" in Meds & Orders section of the refill encounter.              Passed - Blood pressure under 140/90 in past 12 months     BP Readings from Last 3 Encounters:   02/03/22 100/70   09/22/21 136/70                 Passed - Patient has mammogram in past 2 years on file if age 50-75        Passed - Medication is active on med list        Passed - Patient is 18 years of age or older        Passed - No active pregnancy on record        Passed - No positive pregnancy test on record in past 12 months             Prashanth Dietrich RN 10/27/22 7:09 AM  "

## 2022-10-27 NOTE — TELEPHONE ENCOUNTER
"Routing refill request to provider for review/approval because:  Labs not current:  Creatinine    Last Written Prescription Date:  9/22/21  Last Fill Quantity: 12,  # refills: 3   Last office visit provider:  2/3/22     Requested Prescriptions   Pending Prescriptions Disp Refills     alendronate (FOSAMAX) 70 MG tablet [Pharmacy Med Name: ALENDRONATE NA 70 MG TABLET 70 Tablet] 12 tablet 3     Sig: TAKE 1 TABLET (70 MG TOTAL) BY MOUTH EVERY 7 DAYS. TAKE IN THE MORNING ON AN EMPTY STOMACH WITH A FULL GLASS OF WATER 30 MINUTES BEFOR FOOD       Bisphosphonates Failed - 10/25/2022  1:16 PM        Failed - Recent (12 mo) or future (30 days) visit within the authorizing provider's specialty     Patient has had an office visit with the authorizing provider or a provider within the authorizing providers department within the previous 12 mos or has a future within next 30 days. See \"Patient Info\" tab in [employer]sket, or \"Choose Columns\" in Meds & Orders section of the refill encounter.              Failed - Normal serum creatinine on file within past 12 months     Recent Labs   Lab Test 09/22/21  1120   CR 0.75       Ok to refill medication if creatinine is low          Passed - Dexa on file within past 2 years     Please review last Dexa result.           Passed - Medication is active on med list        Passed - Patient is age 18 or older         Signed Prescriptions Disp Refills    sertraline (ZOLOFT) 100 MG tablet 90 tablet 1     Sig: TAKE 1 TABLET (100 MG TOTAL) BY MOUTH DAILY.       SSRIs Protocol Failed - 10/25/2022  1:16 PM        Failed - Recent (12 mo) or future (30 days) visit within the authorizing provider's specialty     Patient has had an office visit with the authorizing provider or a provider within the authorizing providers department within the previous 12 mos or has a future within next 30 days. See \"Patient Info\" tab in inbasket, or \"Choose Columns\" in Meds & Orders section of the refill encounter.              " "Passed - Medication is active on med list        Passed - Patient is age 18 or older        Passed - No active pregnancy on record        Passed - No positive pregnancy test in last 12 months          estradiol (VAGIFEM) 10 MCG TABS vaginal tablet 36 tablet 1     Sig: INSERT 1 TABLET (10 MCG TOTAL) INTO THE VAGINA 3 TIMES A WEEK.       Hormone Replacement Therapy Failed - 10/25/2022  1:16 PM        Failed - Recent (12 mo) or future (30 days) visit within the authorizing provider's specialty     Patient has had an office visit with the authorizing provider or a provider within the authorizing providers department within the previous 12 mos or has a future within next 30 days. See \"Patient Info\" tab in inbasket, or \"Choose Columns\" in Meds & Orders section of the refill encounter.              Passed - Blood pressure under 140/90 in past 12 months     BP Readings from Last 3 Encounters:   02/03/22 100/70   09/22/21 136/70                 Passed - Patient has mammogram in past 2 years on file if age 50-75        Passed - Medication is active on med list        Passed - Patient is 18 years of age or older        Passed - No active pregnancy on record        Passed - No positive pregnancy test on record in past 12 months             Prashanth Dietrich RN 10/27/22 7:12 AM  "

## 2022-11-02 ASSESSMENT — ENCOUNTER SYMPTOMS
CHILLS: 0
NAUSEA: 0
HEMATURIA: 0
EYE PAIN: 0
HEMATOCHEZIA: 0
DYSURIA: 0
COUGH: 0
PARESTHESIAS: 0
SHORTNESS OF BREATH: 0
CONSTIPATION: 0
NERVOUS/ANXIOUS: 0
FREQUENCY: 0
PALPITATIONS: 0
DIARRHEA: 0
DIZZINESS: 0
MYALGIAS: 0
BREAST MASS: 0
WEAKNESS: 0
ARTHRALGIAS: 0
HEARTBURN: 0
HEADACHES: 0
ABDOMINAL PAIN: 0
FEVER: 0
JOINT SWELLING: 0
SORE THROAT: 0

## 2022-11-02 ASSESSMENT — ACTIVITIES OF DAILY LIVING (ADL): CURRENT_FUNCTION: NO ASSISTANCE NEEDED

## 2022-11-09 ENCOUNTER — OFFICE VISIT (OUTPATIENT)
Dept: INTERNAL MEDICINE | Facility: CLINIC | Age: 67
End: 2022-11-09
Payer: COMMERCIAL

## 2022-11-09 VITALS
OXYGEN SATURATION: 96 % | HEART RATE: 66 BPM | TEMPERATURE: 98.7 F | RESPIRATION RATE: 14 BRPM | BODY MASS INDEX: 24.73 KG/M2 | DIASTOLIC BLOOD PRESSURE: 60 MMHG | SYSTOLIC BLOOD PRESSURE: 122 MMHG | HEIGHT: 61 IN | WEIGHT: 131 LBS

## 2022-11-09 DIAGNOSIS — E78.2 ELEVATED TRIGLYCERIDES WITH HIGH CHOLESTEROL: ICD-10-CM

## 2022-11-09 DIAGNOSIS — N95.2 ATROPHIC VAGINITIS: ICD-10-CM

## 2022-11-09 DIAGNOSIS — C44.90 MALIGNANT NEOPLASM OF SKIN: ICD-10-CM

## 2022-11-09 DIAGNOSIS — M81.0 AGE-RELATED OSTEOPOROSIS WITHOUT CURRENT PATHOLOGICAL FRACTURE: ICD-10-CM

## 2022-11-09 DIAGNOSIS — E55.9 VITAMIN D DEFICIENCY: ICD-10-CM

## 2022-11-09 DIAGNOSIS — Z00.00 ENCOUNTER FOR MEDICARE ANNUAL WELLNESS EXAM: Primary | ICD-10-CM

## 2022-11-09 DIAGNOSIS — F34.1 DYSTHYMIC DISORDER: ICD-10-CM

## 2022-11-09 DIAGNOSIS — Z23 HIGH PRIORITY FOR 2019-NCOV VACCINE: ICD-10-CM

## 2022-11-09 LAB
ALBUMIN SERPL BCG-MCNC: 4.6 G/DL (ref 3.5–5.2)
ALP SERPL-CCNC: 46 U/L (ref 35–104)
ALT SERPL W P-5'-P-CCNC: 14 U/L (ref 10–35)
ANION GAP SERPL CALCULATED.3IONS-SCNC: 12 MMOL/L (ref 7–15)
AST SERPL W P-5'-P-CCNC: 22 U/L (ref 10–35)
BASOPHILS # BLD AUTO: 0 10E3/UL (ref 0–0.2)
BASOPHILS NFR BLD AUTO: 1 %
BILIRUB SERPL-MCNC: 0.4 MG/DL
BUN SERPL-MCNC: 9.4 MG/DL (ref 8–23)
CALCIUM SERPL-MCNC: 9.2 MG/DL (ref 8.8–10.2)
CHLORIDE SERPL-SCNC: 103 MMOL/L (ref 98–107)
CHOLEST SERPL-MCNC: 220 MG/DL
CREAT SERPL-MCNC: 0.72 MG/DL (ref 0.51–0.95)
DEPRECATED HCO3 PLAS-SCNC: 23 MMOL/L (ref 22–29)
EOSINOPHIL # BLD AUTO: 0.2 10E3/UL (ref 0–0.7)
EOSINOPHIL NFR BLD AUTO: 6 %
ERYTHROCYTE [DISTWIDTH] IN BLOOD BY AUTOMATED COUNT: 12.7 % (ref 10–15)
GFR SERPL CREATININE-BSD FRML MDRD: >90 ML/MIN/1.73M2
GLUCOSE SERPL-MCNC: 104 MG/DL (ref 70–99)
HCT VFR BLD AUTO: 42.5 % (ref 35–47)
HDLC SERPL-MCNC: 76 MG/DL
HGB BLD-MCNC: 13.9 G/DL (ref 11.7–15.7)
IMM GRANULOCYTES # BLD: 0 10E3/UL
IMM GRANULOCYTES NFR BLD: 0 %
LDLC SERPL CALC-MCNC: 116 MG/DL
LYMPHOCYTES # BLD AUTO: 1.2 10E3/UL (ref 0.8–5.3)
LYMPHOCYTES NFR BLD AUTO: 27 %
MCH RBC QN AUTO: 31 PG (ref 26.5–33)
MCHC RBC AUTO-ENTMCNC: 32.7 G/DL (ref 31.5–36.5)
MCV RBC AUTO: 95 FL (ref 78–100)
MONOCYTES # BLD AUTO: 0.5 10E3/UL (ref 0–1.3)
MONOCYTES NFR BLD AUTO: 11 %
NEUTROPHILS # BLD AUTO: 2.4 10E3/UL (ref 1.6–8.3)
NEUTROPHILS NFR BLD AUTO: 56 %
NONHDLC SERPL-MCNC: 144 MG/DL
PLATELET # BLD AUTO: 207 10E3/UL (ref 150–450)
POTASSIUM SERPL-SCNC: 4.5 MMOL/L (ref 3.4–5.3)
PROT SERPL-MCNC: 7.2 G/DL (ref 6.4–8.3)
RBC # BLD AUTO: 4.49 10E6/UL (ref 3.8–5.2)
SODIUM SERPL-SCNC: 138 MMOL/L (ref 136–145)
TRIGL SERPL-MCNC: 142 MG/DL
TSH SERPL DL<=0.005 MIU/L-ACNC: 1.91 UIU/ML (ref 0.3–4.2)
WBC # BLD AUTO: 4.3 10E3/UL (ref 4–11)

## 2022-11-09 PROCEDURE — 82306 VITAMIN D 25 HYDROXY: CPT | Performed by: INTERNAL MEDICINE

## 2022-11-09 PROCEDURE — 84443 ASSAY THYROID STIM HORMONE: CPT | Performed by: INTERNAL MEDICINE

## 2022-11-09 PROCEDURE — 99214 OFFICE O/P EST MOD 30 MIN: CPT | Mod: 25 | Performed by: INTERNAL MEDICINE

## 2022-11-09 PROCEDURE — 0124A COVID-19,PF,PFIZER BOOSTER BIVALENT: CPT | Performed by: INTERNAL MEDICINE

## 2022-11-09 PROCEDURE — 85025 COMPLETE CBC W/AUTO DIFF WBC: CPT | Performed by: INTERNAL MEDICINE

## 2022-11-09 PROCEDURE — 80061 LIPID PANEL: CPT | Performed by: INTERNAL MEDICINE

## 2022-11-09 PROCEDURE — 36415 COLL VENOUS BLD VENIPUNCTURE: CPT | Performed by: INTERNAL MEDICINE

## 2022-11-09 PROCEDURE — G0439 PPPS, SUBSEQ VISIT: HCPCS | Performed by: INTERNAL MEDICINE

## 2022-11-09 PROCEDURE — 91312 COVID-19,PF,PFIZER BOOSTER BIVALENT: CPT | Performed by: INTERNAL MEDICINE

## 2022-11-09 PROCEDURE — 80053 COMPREHEN METABOLIC PANEL: CPT | Performed by: INTERNAL MEDICINE

## 2022-11-09 RX ORDER — ESTRADIOL 10 UG/1
INSERT VAGINAL
Qty: 36 TABLET | Refills: 3 | Status: SHIPPED | OUTPATIENT
Start: 2022-11-09 | End: 2023-11-13

## 2022-11-09 RX ORDER — ALENDRONATE SODIUM 70 MG/1
TABLET ORAL
Qty: 12 TABLET | Refills: 3 | Status: SHIPPED | OUTPATIENT
Start: 2022-11-09 | End: 2023-11-13

## 2022-11-09 RX ORDER — SERTRALINE HYDROCHLORIDE 100 MG/1
TABLET, FILM COATED ORAL
Qty: 90 TABLET | Refills: 3 | Status: SHIPPED | OUTPATIENT
Start: 2022-11-09 | End: 2023-11-13

## 2022-11-09 ASSESSMENT — ENCOUNTER SYMPTOMS
DIARRHEA: 0
FREQUENCY: 0
PALPITATIONS: 0
SHORTNESS OF BREATH: 0
HEARTBURN: 0
NERVOUS/ANXIOUS: 0
MYALGIAS: 0
ABDOMINAL PAIN: 0
HEADACHES: 0
DIZZINESS: 0
HEMATURIA: 0
WEAKNESS: 0
NAUSEA: 0
SORE THROAT: 0
ARTHRALGIAS: 0
COUGH: 0
EYE PAIN: 0
FEVER: 0
PARESTHESIAS: 0
BREAST MASS: 0
JOINT SWELLING: 0
HEMATOCHEZIA: 0
CHILLS: 0
DYSURIA: 0
CONSTIPATION: 0

## 2022-11-09 ASSESSMENT — PATIENT HEALTH QUESTIONNAIRE - PHQ9
SUM OF ALL RESPONSES TO PHQ QUESTIONS 1-9: 0
10. IF YOU CHECKED OFF ANY PROBLEMS, HOW DIFFICULT HAVE THESE PROBLEMS MADE IT FOR YOU TO DO YOUR WORK, TAKE CARE OF THINGS AT HOME, OR GET ALONG WITH OTHER PEOPLE: NOT DIFFICULT AT ALL
SUM OF ALL RESPONSES TO PHQ QUESTIONS 1-9: 0

## 2022-11-09 ASSESSMENT — ACTIVITIES OF DAILY LIVING (ADL): CURRENT_FUNCTION: NO ASSISTANCE NEEDED

## 2022-11-09 NOTE — PATIENT INSTRUCTIONS
Will decide on Fosamax break after DEXA scan next year.    2. Will need second pneumonia shot (pneumococcal 20).    3. Covid booster today      Patient Education   Personalized Prevention Plan  You are due for the preventive services outlined below.  Your care team is available to assist you in scheduling these services.  If you have already completed any of these items, please share that information with your care team to update in your medical record.  Health Maintenance Due   Topic Date Due    Depression Action Plan  Never done    Pneumococcal Vaccine (2 - PCV) 03/03/2021    ANNUAL REVIEW OF HM ORDERS  09/22/2022    COVID-19 Vaccine (5 - Booster for Pfizer series) 10/04/2022       Signs of Hearing Loss      Hearing much better with one ear can be a sign of hearing loss.   Hearing loss is a problem shared by many people. In fact, it is one of the most common health problems, particularly as people age. Most people age 65 and older have some hearing loss. By age 80, almost everyone does. Hearing loss often occurs slowly over the years. So you may not realize your hearing has gotten worse.  Have your hearing checked  Call your healthcare provider if you:  Have to strain to hear normal conversation  Have to watch other people s faces very carefully to follow what they re saying  Need to ask people to repeat what they ve said  Often misunderstand what people are saying  Turn the volume of the television or radio up so high that others complain  Feel that people are mumbling when they re talking to you  Find that the effort to hear leaves you feeling tired and irritated  Notice, when using the phone, that you hear better with one ear than the other  [employer] last reviewed this educational content on 1/1/2020 2000-2021 [employer]. All rights reserved. This information is not intended as a substitute for professional medical care. Always follow your healthcare professional's instructions.

## 2022-11-09 NOTE — PROGRESS NOTES
SUBJECTIVE:   Addie is a 66 year old who presents for Preventive Visit.   Addie is a 66-year-old female with history of osteopenia with high risk of fracture, depression, basal cell carcinoma, otherwise healthy who is currently here for a physical.    She reports that she has been on Fosamax around 3 years.  Previous bone density showed osteopenia with increased risk of fracture and moderate bone architecture.  Currently she is tolerating Fosamax well, her last DEXA scan was last year.  She takes calcium 600 mg a day and also eats yogurt and cheese daily.    She is very physically active during skiing, Pilates and walking.  She denies any shortness of breath chest pains or change in exercise tolerance.    For anxiety and anhedonia Zoloft has worked well and she denies any new to change the dose.    She has history of basal cell carcinoma and sees dermatologist annually.    She sees ophthalmologist at [facility] and recently had an exam there.  New glasses were prescribed.    No problems with hearing or recent falls.    She does not smoke.    She drinks couple of glasses of alcohol on the weekends but not during the week.    Family history significant for dad with esophageal cancer, smoking, heart disease and heart attack.  Brother with history of heart attack and primary progressive MS diagnosed at the age of 63, brother with Crohn's disease and colon cancer associated with that requiring bowel resection, brother was.  Diabetes and hypertension, sister with hypertension.    Patient has been advised of split billing requirements and indicates understanding: Yes  Are you in the first 12 months of your Medicare coverage?  No    Healthy Habits:     In general, how would you rate your overall health?  Excellent    Frequency of exercise:  4-5 days/week    Duration of exercise:  15-30 minutes    Do you usually eat at least 4 servings of fruit and vegetables a day, include whole grains    & fiber and avoid  "regularly eating high fat or \"junk\" foods?  Yes    Taking medications regularly:  Yes    Medication side effects:  None    Ability to successfully perform activities of daily living:  No assistance needed    Home Safety:  No safety concerns identified    Hearing Impairment:  Difficulty following a conversation in a noisy restaurant or crowded room and difficulty understanding soft or whispered speech    In the past 6 months, have you been bothered by leaking of urine?  No    In general, how would you rate your overall mental or emotional health?  Excellent      PHQ-2 Total Score: 0    Additional concerns today:  No    Do you feel safe in your environment? Yes    Have you ever done Advance Care Planning? (For example, a Health Directive, POLST, or a discussion with a medical provider or your loved ones about your wishes): Yes, patient states has an Advance Care Planning document and will bring a copy to the clinic.  Cognitive Screening   1) Repeat 3 items (Leader, Season, Table)    2) Clock draw: NORMAL  3) 3 item recall: Recalls 3 objects  Results: 3 items recalled: COGNITIVE IMPAIRMENT LESS LIKELY    Mini-CogTM Copyright ANATOLIY Slater. Licensed by the author for use in [facility]; reprinted with permission (soob@[facility]). All rights reserved.      Do you have sleep apnea, excessive snoring or daytime drowsiness?: no    Reviewed and updated as needed this visit by clinical staff    Allergies  Meds              Reviewed and updated as needed this visit by Provider                 Social History     Tobacco Use     Smoking status: Former     Types: Cigarettes     Quit date: 1975     Years since quittin.7     Smokeless tobacco: Never   Substance Use Topics     Alcohol use: Not on file         Alcohol Use 2022   Prescreen: >3 drinks/day or >7 drinks/week? No   AUDIT SCORE  -   Patient Care Team:  Sanjana Mike MD as PCP - General (Internal Medicine)  Sanjana Mike MD as Assigned " PCP  Mesfin Fisher MD as MD (Neurology)    The following health maintenance items are reviewed in Epic and correct as of today:  Health Maintenance   Topic Date Due     DEPRESSION ACTION PLAN  Never done     Pneumococcal Vaccine: 65+ Years (2 - PCV) 03/03/2021     MEDICARE ANNUAL WELLNESS VISIT  09/22/2022     ANNUAL REVIEW OF HM ORDERS  09/22/2022     COVID-19 Vaccine (5 - Booster for Pfizer series) 10/04/2022     PHQ-9  05/09/2023     FALL RISK ASSESSMENT  11/09/2023     COLORECTAL CANCER SCREENING  06/15/2024     MAMMO SCREENING  08/31/2024     LIPID  09/22/2026     ADVANCE CARE PLANNING  09/22/2026     DTAP/TDAP/TD IMMUNIZATION (3 - Td or Tdap) 06/25/2029     DEXA  10/14/2036     HEPATITIS C SCREENING  Completed     INFLUENZA VACCINE  Completed     ZOSTER IMMUNIZATION  Completed     IPV IMMUNIZATION  Aged Out     MENINGITIS IMMUNIZATION  Aged Out   pport (Optional):[id number]}    FHS-7:   Breast CA Risk Assessment (FHS-7) 8/25/2021 8/31/2022 11/2/2022   Did any of your first-degree relatives have breast or ovarian cancer? No No No   Did any of your relatives have bilateral breast cancer? No No No   Did any man in your family have breast cancer? No No No   Did any woman in your family have breast and ovarian cancer? No No No   Did any woman in your family have breast cancer before age 50 y? No No No   Do you have 2 or more relatives with breast and/or ovarian cancer? No No No   Do you have 2 or more relatives with breast and/or bowel cancer? No No No     {IPertinent mammograms are reviewed under the imaging tab.    Review of Systems   Constitutional: Negative for chills and fever.   HENT: Negative for congestion, ear pain, hearing loss and sore throat.    Eyes: Negative for pain and visual disturbance.   Respiratory: Negative for cough and shortness of breath.    Cardiovascular: Negative for chest pain, palpitations and peripheral edema.   Gastrointestinal: Negative for abdominal pain, constipation, diarrhea,  "heartburn, hematochezia and nausea.   Breasts:  Negative for tenderness, breast mass and discharge.   Genitourinary: Negative for dysuria, frequency, genital sores, hematuria, pelvic pain, urgency, vaginal bleeding and vaginal discharge.   Musculoskeletal: Negative for arthralgias, joint swelling and myalgias.   Skin: Negative for rash.   Neurological: Negative for dizziness, weakness, headaches and paresthesias.   Psychiatric/Behavioral: Negative for mood changes. The patient is not nervous/anxious.      As above, he denies any constipation diarrhea or blood in the stool, no vaginal spotting or    OBJECTIVE:   /60   Pulse 66   Temp 98.7  F (37.1  C) (Tympanic)   Resp 14   Ht 1.549 m (5' 1\")   Wt 59.4 kg (131 lb)   SpO2 96%   BMI 24.75 kg/m   Estimated body mass index is 24.75 kg/m  as calculated from the following:    Height as of this encounter: 1.549 m (5' 1\").    Weight as of this encounter: 59.4 kg (131 lb).  Physical Exam  General: well appearing female, alert and oriented x3  EYES: Eyelids, conjunctiva, and sclera were normal. Pupils were normal.   HEAD, EARS, NOSE, MOUTH, AND THROAT: no cervical LAD, no thyromegaly or nodules appreciated. TMs are visualized and normal, oropharynx is clear.  RESPIRATORY: respirations non labored, CTA bl, no wheezes, rales, no forced expiratory wheezing.  CARDIOVASCULAR: Heart rate and rhythm were normal. No murmurs, rubs,gallops. There was no peripheral edema. No carotid bruits.  GASTROINTESTINAL: Positive bowel sounds, abdomen is soft, non tender, non distended.     MUSCULOSKELETAL: Muscle mass was normal for age. No joint synovitis or deformity.  LYMPHATIC: There were no enlarged nodes palpable.  SKIN/HAIR/NAILS: Skin color was normal.  No rashes.  NEUROLOGIC: The patient was alert and oriented.  Speech was normal.  There is no facial asymmetry.   PSYCHIATRIC:  Mood and affect were normal.   Breast exam: No axilla lymphadenopathy, breast changes or masses " "appreciated.      ASSESSMENT / PLAN:   Addie was seen today for medicare visit and imm/inj.    Diagnoses and all orders for this visit:    Encounter for Medicare annual wellness exam  Preventative care reviewed, she will get COVID booster today, fasting blood work today.  -     CBC with platelets and differential  -     COVID-19,PF,PFIZER BOOSTER BIVALENT 12+Yrs    Age-related osteoporosis without current pathological fracture  She has been on osteoporosis for about 3 years.  Last bone density test last year showed osteopenia with moderate risk of fracture and moderate microarchitecture.  Discussed to repeat bone density next year and we could consider Fosamax holiday at that time.  She will continue on calcium supplementation and vitamin D supplement.    Vitamin D deficiency  -     Vitamin D Deficiency    Basal Cell Carcinoma Of The Skin  She sees dermatologist annually    Dysthymic disorder  Well-controlled on current dose of Zoloft.    Elevated triglycerides with high cholesterol  -     Comprehensive metabolic panel  -     Lipid panel reflex to direct LDL Fasting  -     TSH with free T4 reflex    Atrophic vaginitis  -     estradiol (VAGIFEM) 10 MCG TABS vaginal tablet; One tab vaginally twice a week    Other orders  -     alendronate (FOSAMAX) 70 MG tablet; One tab by mouth weekly  -     sertraline (ZOLOFT) 100 MG tablet; TAKE 1 TABLET (100 MG TOTAL) BY MOUTH DAILY.        Estimated body mass index is 24.75 kg/m  as calculated from the following:    Height as of this encounter: 1.549 m (5' 1\").    Weight as of this encounter: 59.4 kg (131 lb).      She reports that she quit smoking about 47 years ago. She has never used smokeless tobacco.      Appropriate preventive services were discussed with this patient, including applicable screening as appropriate for cardiovascular disease, diabetes, osteopenia/osteoporosis, and glaucoma.  As appropriate for age/gender, discussed screening for colorectal cancer, prostate " cancer, breast cancer, and cervical cancer. Checklist reviewing preventive services available has been given to the patient.    Reviewed patients plan of care and provided an AVS. The Basic Care Plan (routine screening as documented in Health Maintenance) for Addie meets the Care Plan requirement. This Care Plan has been established and reviewed with the Patient.    Eliana Richter MD  [facility]    Identified Health Risks:  Answers for HPI/ROS submitted by the patient on 11/9/2022  If you checked off any problems, how difficult have these problems made it for you to do your work, take care of things at home, or get along with other people?: Not difficult at all  PHQ9 TOTAL SCORE: 0

## 2022-11-10 LAB — DEPRECATED CALCIDIOL+CALCIFEROL SERPL-MC: 60 UG/L (ref 20–75)

## 2023-01-03 ENCOUNTER — TRANSFERRED RECORDS (OUTPATIENT)
Dept: HEALTH INFORMATION MANAGEMENT | Facility: CLINIC | Age: 68
End: 2023-01-03

## 2023-09-05 ENCOUNTER — HOSPITAL ENCOUNTER (OUTPATIENT)
Dept: MAMMOGRAPHY | Facility: CLINIC | Age: 68
Discharge: HOME | End: 2023-09-05
Attending: INTERNAL MEDICINE | Admitting: INTERNAL MEDICINE
Payer: COMMERCIAL

## 2023-09-05 DIAGNOSIS — Z12.31 VISIT FOR SCREENING MAMMOGRAM: ICD-10-CM

## 2023-09-05 PROCEDURE — 77067 SCR MAMMO BI INCL CAD: CPT

## 2023-09-30 ENCOUNTER — MYC MEDICAL ADVICE (OUTPATIENT)
Dept: FAMILY MEDICINE | Facility: CLINIC | Age: 68
End: 2023-09-30

## 2023-09-30 ENCOUNTER — NURSE TRIAGE (OUTPATIENT)
Dept: NURSING | Facility: CLINIC | Age: 68
End: 2023-09-30
Payer: COMMERCIAL

## 2023-09-30 NOTE — TELEPHONE ENCOUNTER
COVID Positive/Requesting COVID treatment    Patient is positive for COVID and requesting treatment options.    Date of positive COVID test (PCR or at home)? 9/30/2023  Current COVID symptoms: cough and fatigue  Date COVID symptoms began: 9/29/2023    Message should be routed to clinic RN pool. Best phone number to use for call back: [phone]        Reason for Disposition   HIGH RISK patient (e.g., weak immune system, age > 64 years, obesity with BMI of 30 or higher, pregnant, chronic lung disease or other chronic medical condition) and COVID symptoms (e.g., cough, fever)  (Exceptions: Already seen by doctor or NP/PA and no new or worsening symptoms.)    Additional Information   Negative: SEVERE difficulty breathing (e.g., struggling for each breath, speaks in single words)   Negative: Difficult to awaken or acting confused (e.g., disoriented, slurred speech)   Negative: Bluish (or gray) lips or face now   Negative: Shock suspected (e.g., cold/pale/clammy skin, too weak to stand, low BP, rapid pulse)   Negative: Sounds like a life-threatening emergency to the triager   Negative: MODERATE difficulty breathing (e.g., speaks in phrases, SOB even at rest, pulse 100-120)   Negative: Headache and stiff neck (can't touch chin to chest)   Negative: Oxygen level (e.g., pulse oximetry) 90% or lower   Negative: SEVERE or constant chest pain or pressure  (Exception: Mild central chest pain, present only when coughing.)   Negative: Chest pain or pressure  (Exception: MILD central chest pain, present only when coughing.)   Negative: Drinking very little and dehydration suspected (e.g., no urine > 12 hours, very dry mouth, very lightheaded)   Negative: Patient sounds very sick or weak to the triager   Negative: MILD difficulty breathing (e.g., minimal/no SOB at rest, SOB with walking, pulse <100)   Negative: Fever > 103 F (39.4 C)   Negative: Fever > 101 F (38.3 C) and over 60 years of age   Negative: Fever > 100.0 F (37.8  C) and bedridden (e.g., CVA, chronic illness, recovering from surgery)    Protocols used: Coronavirus (COVID-19) Diagnosed or [id number]

## 2023-10-02 NOTE — TELEPHONE ENCOUNTER
"Spoke with pt who reports her fever has broken. She still has some fatigue and congestion, which she is managing well with OTC medication.  Reports \"I feel like I am over the hump.\"  Pt's  has no Covid-19 symptoms at this time.    Leila Preciado, NIIN, RN  10/02/23, 5:01 PM    "

## 2023-11-13 ENCOUNTER — OFFICE VISIT (OUTPATIENT)
Dept: FAMILY MEDICINE | Facility: CLINIC | Age: 68
End: 2023-11-13
Payer: COMMERCIAL

## 2023-11-13 VITALS
RESPIRATION RATE: 15 BRPM | TEMPERATURE: 96.5 F | WEIGHT: 131 LBS | HEIGHT: 61 IN | HEART RATE: 64 BPM | BODY MASS INDEX: 24.73 KG/M2 | OXYGEN SATURATION: 98 % | SYSTOLIC BLOOD PRESSURE: 135 MMHG | DIASTOLIC BLOOD PRESSURE: 73 MMHG

## 2023-11-13 DIAGNOSIS — N95.2 ATROPHIC VAGINITIS: ICD-10-CM

## 2023-11-13 DIAGNOSIS — F34.1 DYSTHYMIC DISORDER: ICD-10-CM

## 2023-11-13 DIAGNOSIS — Z13.228 SCREENING FOR METABOLIC DISORDER: ICD-10-CM

## 2023-11-13 DIAGNOSIS — R73.09 ELEVATED GLUCOSE: ICD-10-CM

## 2023-11-13 DIAGNOSIS — Z13.220 SCREENING FOR LIPID DISORDERS: ICD-10-CM

## 2023-11-13 DIAGNOSIS — Z23 NEED FOR VACCINATION: ICD-10-CM

## 2023-11-13 DIAGNOSIS — Z00.00 ENCOUNTER FOR MEDICARE ANNUAL WELLNESS EXAM: Primary | ICD-10-CM

## 2023-11-13 DIAGNOSIS — Z78.0 MENOPAUSE: ICD-10-CM

## 2023-11-13 PROBLEM — N90.89 LESION OF VULVA: Status: ACTIVE | Noted: 2022-04-29

## 2023-11-13 LAB
ANION GAP SERPL CALCULATED.3IONS-SCNC: 12 MMOL/L (ref 7–15)
BUN SERPL-MCNC: 11.7 MG/DL (ref 8–23)
CALCIUM SERPL-MCNC: 9.4 MG/DL (ref 8.8–10.2)
CHLORIDE SERPL-SCNC: 106 MMOL/L (ref 98–107)
CHOLEST SERPL-MCNC: 212 MG/DL
CREAT SERPL-MCNC: 0.72 MG/DL (ref 0.51–0.95)
DEPRECATED HCO3 PLAS-SCNC: 23 MMOL/L (ref 22–29)
EGFRCR SERPLBLD CKD-EPI 2021: >90 ML/MIN/1.73M2
GLUCOSE SERPL-MCNC: 89 MG/DL (ref 70–99)
HBA1C MFR BLD: 6.1 %
HDLC SERPL-MCNC: 71 MG/DL
LDLC SERPL CALC-MCNC: 117 MG/DL
NONHDLC SERPL-MCNC: 141 MG/DL
POTASSIUM SERPL-SCNC: 4.4 MMOL/L (ref 3.4–5.3)
SODIUM SERPL-SCNC: 141 MMOL/L (ref 135–145)
TRIGL SERPL-MCNC: 121 MG/DL

## 2023-11-13 RX ORDER — SERTRALINE HYDROCHLORIDE 100 MG/1
TABLET, FILM COATED ORAL
Qty: 90 TABLET | Refills: 3 | Status: SHIPPED | OUTPATIENT
Start: 2023-11-13 | End: 2023-12-05

## 2023-11-13 RX ORDER — FLUOCINONIDE TOPICAL SOLUTION USP, 0.05% 0.5 MG/ML
SOLUTION TOPICAL 2 TIMES DAILY PRN
COMMUNITY
End: 2023-11-27

## 2023-11-13 RX ORDER — CLOBETASOL PROPIONATE 0.5 MG/G
OINTMENT TOPICAL
COMMUNITY

## 2023-11-13 RX ORDER — ESTRADIOL 10 UG/1
INSERT VAGINAL
Qty: 36 TABLET | Refills: 3 | Status: SHIPPED | OUTPATIENT
Start: 2023-11-13 | End: 2023-12-05

## 2023-11-13 ASSESSMENT — ANXIETY QUESTIONNAIRES
3. WORRYING TOO MUCH ABOUT DIFFERENT THINGS: NOT AT ALL
GAD7 TOTAL SCORE: 0
1. FEELING NERVOUS, ANXIOUS, OR ON EDGE: NOT AT ALL
5. BEING SO RESTLESS THAT IT IS HARD TO SIT STILL: NOT AT ALL
7. FEELING AFRAID AS IF SOMETHING AWFUL MIGHT HAPPEN: NOT AT ALL
2. NOT BEING ABLE TO STOP OR CONTROL WORRYING: NOT AT ALL
6. BECOMING EASILY ANNOYED OR IRRITABLE: NOT AT ALL
IF YOU CHECKED OFF ANY PROBLEMS ON THIS QUESTIONNAIRE, HOW DIFFICULT HAVE THESE PROBLEMS MADE IT FOR YOU TO DO YOUR WORK, TAKE CARE OF THINGS AT HOME, OR GET ALONG WITH OTHER PEOPLE: NOT DIFFICULT AT ALL
GAD7 TOTAL SCORE: 0

## 2023-11-13 ASSESSMENT — PATIENT HEALTH QUESTIONNAIRE - PHQ9
SUM OF ALL RESPONSES TO PHQ QUESTIONS 1-9: 0
5. POOR APPETITE OR OVEREATING: NOT AT ALL

## 2023-11-13 NOTE — PATIENT INSTRUCTIONS
Patient Education   Personalized Prevention Plan  You are due for the preventive services outlined below.  Your care team is available to assist you in scheduling these services.  If you have already completed any of these items, please share that information with your care team to update in your medical record.  Health Maintenance Due   Topic Date Due     Depression Action Plan  Never done     LUNG CANCER SCREENING  Never done     RSV VACCINE (Pregnancy & 60+) (1 - 1-dose 60+ series) Never done     Pneumococcal Vaccine (2 - PCV) 03/03/2021     Depression Assessment  05/09/2023     Flu Vaccine (1) 09/01/2023     COVID-19 Vaccine (6 - 2023-24 season) 09/01/2023     FALL RISK ASSESSMENT  11/09/2023

## 2023-11-13 NOTE — NURSING NOTE
"67 year old  Chief Complaint   Patient presents with    Physical       Blood pressure 135/73, pulse 64, temperature (!) 96.5  F (35.8  C), temperature source Skin, resp. rate 15, height 1.549 m (5' 1\"), weight 59.4 kg (131 lb), SpO2 98%. Body mass index is 24.75 kg/m .  Patient Active Problem List   Diagnosis    Basal Cell Carcinoma Of The Skin    Vitamin D Deficiency    Dysthymic Disorder    Mammogram - Abnormal    Sarcoidosis    Hemorrhoids       Wt Readings from Last 2 Encounters:   23 59.4 kg (131 lb)   22 59.4 kg (131 lb)     BP Readings from Last 3 Encounters:   23 135/73   22 122/60   22 100/70         Current Outpatient Medications   Medication    aspirin 81 MG EC tablet    calcium carbonate (OS-NILE) 1500 (600 Ca) MG tablet    estradiol (VAGIFEM) 10 MCG TABS vaginal tablet    sertraline (ZOLOFT) 100 MG tablet    Vitamin D-Vitamin K (VITAMIN K2-VITAMIN D3 PO)    alendronate (FOSAMAX) 70 MG tablet    triamcinolone (KENALOG) 0.1 % external cream     No current facility-administered medications for this visit.       Social History     Tobacco Use    Smoking status: Former     Types: Cigarettes     Quit date: 1975     Years since quittin.7    Smokeless tobacco: Never   Vaping Use    Vaping Use: Never used       Health Maintenance Due   Topic Date Due    DEPRESSION ACTION PLAN  Never done    LUNG CANCER SCREENING  Never done    RSV VACCINE (Pregnancy & 60+) (1 - 1-dose 60+ series) Never done    Pneumococcal Vaccine: 65+ Years (2 - PCV) 2021    PHQ-9  2023    FALL RISK ASSESSMENT  2023       No results found for: \"PAP\"      2023 9:30 AM    "

## 2023-11-13 NOTE — PROGRESS NOTES
SUBJECTIVE:   Addie is a 67 year old who presents for Preventive Visit.      Are you in the first 12 months of your Medicare coverage?  No    HPI  # Health Maintenance  New Patient  Establish Care  - BP:   BP Readings from Last 3 Encounters:   11/13/23 135/73   11/09/22 122/60   02/03/22 100/70   - Cholesterol: pending  Recent Labs   Lab Test 11/09/22  1254   CHOL 220*   HDL 76   *   TRIG 142   The ASCVD Risk score (Jody WHITEHEAD, et al., 2019) failed to calculate for the following reasons:    The systolic blood pressure is missing  - Diabetes Screening: pending  - Lung Cancer Screening: not indicated  55-81yo w/30py smoking history and currently smoking OR quit within past 15 years:  Low dose CT annually and discontinued once a person has been 15 years tobacco free  - (+) seatbelt use, (+) helmet, (+) smoke detector  - Feels safe at home, denies verbal/physical/emotional abuse in past year: yes  - Colonoscopy: Cologuard 6/2021, repeat 2024  - Mammogram: 9/2023  - DEXA: 10/2021. Some confusion as Addie reports she was previously taking a bisphosphonate up until she had jaw surgery, but she thinks previous DEXA scans have all indicated osteopenia. She also states that previous providers have advised her to repeat DEXA scans every two years. Will order follow up DEXA today as I have some suspicion Addie actually has osteoporosis and we may need to restart medical treatment.       Have you ever done Advance Care Planning? (For example, a Health Directive, POLST, or a discussion with a medical provider or your loved ones about your wishes): Yes, patient states has an Advance Care Planning document and will bring a copy to the clinic.       Fall risk: no concerns today     Cognitive Screening   1) Repeat 3 items (Leader, Season, Table)    2) Clock draw: NORMAL  3) 3 item recall: Recalls 3 objects  Results: 3 items recalled: COGNITIVE IMPAIRMENT LESS LIKELY    Mini-CogTM Copyright S Bryon. Licensed by the author for  use in [facility]; reprinted with permission ([email or web address]). All rights reserved.      Do you have sleep apnea, excessive snoring or daytime drowsiness? : no    Reviewed and updated as needed this visit by clinical staff   Tobacco  Allergies  Meds  Problems  Med Hx  Surg Hx  Fam Hx          Reviewed and updated as needed this visit by Provider                 Social History     Tobacco Use    Smoking status: Former     Types: Cigarettes     Quit date: 1975     Years since quittin.7    Smokeless tobacco: Never   Substance Use Topics    Alcohol use: Not on file         2022    11:03 AM   Alcohol Use   Prescreen: >3 drinks/day or >7 drinks/week? No       PROBLEMS TO ADD ON...  No      Current providers sharing in care for this patient include:   Patient Care Team:  Prashanth Estrada MD as PCP - General (Family Medicine)  Mesfin Fisher MD as MD (Neurology)  Eliana Richter MD as Assigned PCP    The following health maintenance items are reviewed in Epic and correct as of today:  Health Maintenance   Topic Date Due    DEPRESSION ACTION PLAN  Never done    LUNG CANCER SCREENING  Never done    RSV VACCINE (Pregnancy & 60+) (1 - 1-dose 60+ series) Never done    Pneumococcal Vaccine: 65+ Years (2 - PCV) 2021    PHQ-9  2023    INFLUENZA VACCINE (1) 2023    COVID-19 Vaccine (6 - -24 season) 2023    FALL RISK ASSESSMENT  2023    COLORECTAL CANCER SCREENING  06/15/2024    MEDICARE ANNUAL WELLNESS VISIT  2024    MAMMO SCREENING  2025    LIPID  2027    ADVANCE CARE PLANNING  2027    DTAP/TDAP/TD IMMUNIZATION (4 - Td or Tdap) 2029    DEXA  10/14/2036    HEPATITIS C SCREENING  Completed    ZOSTER IMMUNIZATION  Completed    IPV IMMUNIZATION  Aged Out    HPV IMMUNIZATION  Aged Out    MENINGITIS IMMUNIZATION  Aged Out    RSV MONOCLONAL ANTIBODY  Aged Out         FHS-7:       2021    10:51 AM 2022    12:17 PM 2022     "11:05 AM 9/5/2023    10:28 AM   Breast CA Risk Assessment (FHS-7)   Did any of your first-degree relatives have breast or ovarian cancer? No No No No   Did any of your relatives have bilateral breast cancer? No No No No   Did any man in your family have breast cancer? No No No No   Did any woman in your family have breast and ovarian cancer? No No No No   Did any woman in your family have breast cancer before age 50 y? No No No No   Do you have 2 or more relatives with breast and/or ovarian cancer? No No No No   Do you have 2 or more relatives with breast and/or bowel cancer? No No No No     Mammogram Screening: Recommended mammography every 1-2 years with patient discussion and risk factor consideration  Pertinent mammograms are reviewed under the imaging tab.    Review of Systems  Constitutional, HEENT, cardiovascular, pulmonary, gi and gu systems are negative, except as otherwise noted.    OBJECTIVE:   There were no vitals taken for this visit. Estimated body mass index is 24.75 kg/m  as calculated from the following:    Height as of 11/9/22: 1.549 m (5' 1\").    Weight as of 11/9/22: 59.4 kg (131 lb).  Physical Exam  GENERAL: healthy, alert and no distress  NECK: no adenopathy, no asymmetry, masses, or scars and thyroid normal to palpation  RESP: lungs clear to auscultation - no rales, rhonchi or wheezes  CV: regular rate and rhythm, normal S1 S2, no S3 or S4, no murmur, click or rub, no peripheral edema and peripheral pulses strong  ABDOMEN: soft, nontender, no hepatosplenomegaly, no masses and bowel sounds normal  MS: no gross musculoskeletal defects noted, no edema    Diagnostic Test Results:  Labs reviewed in Epic    ASSESSMENT / PLAN:   Addie was seen today for physical.    Diagnoses and all orders for this visit:    Encounter for Medicare annual wellness exam    Need for vaccination  -     PNEUMOCOCCAL 20 VALENT CONJUGATE (PREVNAR 20)    Screening for metabolic disorder  -     Basic Metabolic Panel ([place]); Future  -     Basic Metabolic Panel ([place])    Screening for lipid disorders  -     Lipid Profile; Future  -     Lipid Profile    Elevated glucose  -     Hemoglobin A1c; Future  -     Cancel: Hemoglobin A1c  -     Hemoglobin A1c    Menopause  -     Dexa hip/pelvis/spine*; Future    Dysthymic Disorder  -     sertraline (ZOLOFT) 100 MG tablet; TAKE 1 TABLET (100 MG TOTAL) BY MOUTH DAILY.    Atrophic vaginitis  -     estradiol (VAGIFEM) 10 MCG TABS vaginal tablet; One tab vaginally twice a week        Patient has been advised of split billing requirements and indicates understanding: Yes      COUNSELING:  Reviewed preventive health counseling, as reflected in patient instructions        She reports that she quit smoking about 48 years ago. She has never used smokeless tobacco.      Appropriate preventive services were discussed with this patient, including applicable screening as appropriate for fall prevention, nutrition, physical activity, Tobacco-use cessation, weight loss and cognition.  Checklist reviewing preventive services available has been given to the patient.    Reviewed patients plan of care and provided an AVS. The Basic Care Plan (routine screening as documented in Health Maintenance) for Addie meets the Care Plan requirement. This Care Plan has been established and reviewed with the Patient.          MD ABE [facility]

## 2023-11-21 ENCOUNTER — ANCILLARY PROCEDURE (OUTPATIENT)
Dept: BONE DENSITY | Facility: CLINIC | Age: 68
End: 2023-11-21
Attending: FAMILY MEDICINE
Payer: COMMERCIAL

## 2023-11-21 DIAGNOSIS — Z78.0 MENOPAUSE: ICD-10-CM

## 2023-11-21 PROCEDURE — 77080 DXA BONE DENSITY AXIAL: CPT | Mod: TC | Performed by: PHYSICIAN ASSISTANT

## 2023-11-25 NOTE — PROGRESS NOTES
ASSESSMENT:  This is a 68-year-old postmenopausal female who has osteopenia by T-scores.  She has several risk factors and by FRAX her risk of fracture is high.  She has been on alendronate in the past and tolerated this.  She has been off for a year because of a tooth implant.  At this time given her moderate to high risk I would suggest going back on alendronate for another 2 to 3 years.  We reviewed calcium and vitamin D.  Will get some blood work to make sure her bone metabolism is normal.  I would see her again in 2 years after her next DEXA.    PLAN:  Start alendronate 70 mg/day  - take for 2-3 yrs  Get blood work today    Patient should take 1200mg of calcium/day in divided doses and vitamin D3 5000IU/day.  Calcium in food is best  If taking pills you don't absorb more than 5-600mg at a time  Look at your pills and serving size - might need 2 pills to get 600mg    DXA in 2 yrs 11/2025  See me after the next DXA     Thank you for allowing me to participate in the care of your patient.  Please do not hesitate to call with questions or concerns.    Sincerely,  Magdalene Butterfield MD, PhD  CC Prashanth Estrada MD           Addie is a  68 year old female [post menopausal] [GR2, P2] that presents today for osteoporosis consult.   Referring Physician: Prashanth Estrada MD     HPI     Have you ever had a bone density test? Yes  Where = [place]  When = 11/2023  Spine Tscore = -1.4  Left neck Tscore = -1.9  Total left hip Tscore = -1.3  Right neck Tscore = -2.1  Total Right hip Tscore = -1.4  Have you received any x-ray dye or contrast in the last ten days? No  How many servings of dairy products do you consume per day? 2 Type: yogurt, greens, cheese   Do you take a multi-vitamin daily? No  Do you take a vitamin D supplement? Yes 5000IU/day   Do you take a calcium supplement daily? [facility] calcium  - 600mg 1/ day   Do you take a supplement containing strontium? No  Are you exposed to natural sunlight at least 20 minutes three  "times a week? Yes  Have you broken any bones during your adult life? No  How tall were you at age 24? 61\"  Have you gone through menopause? Yes  Did your menopause occur before age 45? No  Have you taken hormone therapy? No  vaginal estrogen tablet 2x/wk     Social History   reports that she quit smoking about 48 years ago. Her smoking use included cigarettes. She has never used smokeless tobacco.  Do you smoke cigarettes? Reformed smoker   Do you exercise? Yes. Details: walking daily 1 mile  - biking/ cross country ski  Do you drink alcohol? Yes. Details: few glasses wine weekend nights     Medication History  Have you taken any of the following medications?   Blood thinner (Coumadin or Heparin): No   Chemotherapy (ex: Lupron, Arimidex): No   Depo Provera: No   Anti-Seizure (ex: Dilantin, Depakote): No   Steroids (ex: Prednisone, Cortisone): No   Thyroid (ex: Synthroid): No   No radiation treatment   Have you used any of the following medications?   Actonel (Risedronate): No   Aredia (Pamidronate): No   Boniva (Ibindronate): No   Didronil (Etidronate): No   Evista (Raloxifene): No   Fosamax (Alendronate): 3 yrs  - stopped 1 yr ago because of dental implant ( 12/2022)   Forteo (Parathyroid hormone) injections: No   HCTZ (Thiazide): No   Calcitonin nasal spray: No   Reclast or Zometa (Zolendronate): No   Prolia (Denosumab): No   HT: vaginal estrogen tablets 2x/wk  Current Outpatient Medications   Medication Sig Dispense Refill    aspirin 81 MG EC tablet [ASPIRIN 81 MG EC TABLET] Take 81 mg by mouth every other day.      calcium carbonate (OS-NILE) 1500 (600 Ca) MG tablet Calcium 600      clobetasol (TEMOVATE) 0.05 % external ointment APPLY A THIN LAYER TO AFFECTED AREA (S) TOPICALLY 2 TIMES PER DAY.      estradiol (VAGIFEM) 10 MCG TABS vaginal tablet One tab vaginally twice a week 36 tablet 3    fluocinonide (LIDEX) 0.05 % external solution Apply topically 2 times daily as needed      sertraline (ZOLOFT) 100 MG tablet " "TAKE 1 TABLET (100 MG TOTAL) BY MOUTH DAILY. 90 tablet 3    Vitamin D-Vitamin K (VITAMIN K2-VITAMIN D3 PO) (Vit D 5000iu, Vit K 45mcg)            Allergies   Allergen Reactions    Other Allergy (See Comments) [External Allergen Needs Reconciliation - See Comment] Hives     Childhood reaction    Sulfa (Sulfonamide Antibiotics) [Sulfa Antibiotics] Unknown       Past Medical History  Do you have or have you had any of the following?   Celiac sprue (wheat intolerance):No   Chronic low back problems (ex: scohosis, arthritis): No   Diabetes mellitus: No   High blood calcium level: No   Hip or spine injury: No   History of an eating disorder: No   History of gastric bypass: No   Hyperparathyroidism: No   Inflammatory bowel disease (ex: Crohn's, ulcerative colitis): No   Kidney disease: No   Kidney stones: No   Liver disease: No   Lupus: No   Overactive thyroid gland: No   Rhuematoid arthritis: No    Have you had any of the following?   Hysterectomy: No   Ovaries removed: No   Breast cancer: No   Family history of breast cancer: No    Family History   Problem Relation Age of Onset    Chronic Obstructive Pulmonary Disease Mother     Heart Disease Mother     Esophageal Cancer Father     Heart Disease Father     Hyperlipidemia Brother     Hypertension Brother     Heart Disease Brother     Thyroid nodules Daughter     Depression Daughter      Is there a family history of osteoporosis? Yes. Details: mother  Did either parent have a hip fracture? Yes    ROS:  General: hot flashes  Head/Eyes: none  Ears/Nose/Throat: none  Cardiovascular: none  Respiratory: none  Gastrointestinal: none  Breast: none  Genitourinary: vaginal dryness  Sexual Function: none  Musculoskeletal: joint pain, stiffness, and back pain  Skin: rashes  Neurological: none  Mental Health: depression  Endocrine: temperature intolerance    Clinic Measurements  Vitals: /72   Pulse 69   Ht 1.537 m (5' 0.5\")   Wt 57.6 kg (127 lb)   BMI 24.39 kg/m    BMI= Body " mass index is 24.39 kg/m .    Physical exam  Constitutional: Well appearing woman in no acute distress.   Psychological: appropriate mood.  Neck: No thyroidmegaly. No jugular venous distension, no carotid bruits.  Cardiovascular: regular rate and rhythm, normal S1 and S2, no murmurs, rubs or gallops,   Respiratory: clear to auscultation, no wheezes or crackles, normal breath sounds.  Gastrointestinal: positive bowel sounds, nontender, no hepatosplenomegaly, no masses. No guarding or rebound.  Spine: Straight, not tender, Flexion good, Extension good, Lateral movement good, Rotational movement good  Musculoskeletal: full range of motion, no edema, and motor strength is equal in the upper and lower extremities    Skin: no concerning lesions, no jaundice.  Neurological: cranial nerves intact, normal strength, reflexes at patella and biceps normal, normal gait, no tremor.     LAB  Vertebra; Fracture Assessment: NA  Dexa Scan: 11/2023      FRAX Assessment Tool: [N/A, 19.9% for 10 risk Major Osteoporotic, 4.3% for 10 risk of Hip Fracture]  Risk Factors: age, menopause, reformed smoker, +FHx, mom fx hip, hx low vit D      Magdalene Butterfield MD, PhD

## 2023-11-27 ENCOUNTER — OFFICE VISIT (OUTPATIENT)
Dept: FAMILY MEDICINE | Facility: CLINIC | Age: 68
End: 2023-11-27
Attending: FAMILY MEDICINE
Payer: COMMERCIAL

## 2023-11-27 ENCOUNTER — LAB (OUTPATIENT)
Dept: LAB | Facility: CLINIC | Age: 68
End: 2023-11-27
Attending: FAMILY MEDICINE
Payer: COMMERCIAL

## 2023-11-27 VITALS
BODY MASS INDEX: 23.98 KG/M2 | HEART RATE: 69 BPM | DIASTOLIC BLOOD PRESSURE: 72 MMHG | SYSTOLIC BLOOD PRESSURE: 130 MMHG | HEIGHT: 61 IN | WEIGHT: 127 LBS

## 2023-11-27 DIAGNOSIS — M85.89 OSTEOPENIA OF MULTIPLE SITES: ICD-10-CM

## 2023-11-27 DIAGNOSIS — M85.89 OSTEOPENIA OF MULTIPLE SITES: Primary | ICD-10-CM

## 2023-11-27 LAB
MAGNESIUM SERPL-MCNC: 2.2 MG/DL (ref 1.7–2.3)
PHOSPHATE SERPL-MCNC: 3.2 MG/DL (ref 2.5–4.5)
PTH-INTACT SERPL-MCNC: 27 PG/ML (ref 15–65)
TSH SERPL DL<=0.005 MIU/L-ACNC: 2.73 UIU/ML (ref 0.3–4.2)
VIT D+METAB SERPL-MCNC: 70 NG/ML (ref 20–50)

## 2023-11-27 PROCEDURE — 36415 COLL VENOUS BLD VENIPUNCTURE: CPT

## 2023-11-27 PROCEDURE — 84080 ASSAY ALKALINE PHOSPHATASES: CPT | Performed by: FAMILY MEDICINE

## 2023-11-27 PROCEDURE — 99204 OFFICE O/P NEW MOD 45 MIN: CPT | Performed by: FAMILY MEDICINE

## 2023-11-27 PROCEDURE — 84443 ASSAY THYROID STIM HORMONE: CPT

## 2023-11-27 PROCEDURE — 82306 VITAMIN D 25 HYDROXY: CPT

## 2023-11-27 PROCEDURE — 83970 ASSAY OF PARATHORMONE: CPT

## 2023-11-27 PROCEDURE — 83735 ASSAY OF MAGNESIUM: CPT

## 2023-11-27 PROCEDURE — G0463 HOSPITAL OUTPT CLINIC VISIT: HCPCS | Performed by: FAMILY MEDICINE

## 2023-11-27 PROCEDURE — 84100 ASSAY OF PHOSPHORUS: CPT

## 2023-11-27 RX ORDER — ALENDRONATE SODIUM 70 MG/1
70 TABLET ORAL
Qty: 12 TABLET | Refills: 3 | Status: SHIPPED | OUTPATIENT
Start: 2023-11-27 | End: 2023-12-05

## 2023-11-27 ASSESSMENT — PAIN SCALES - GENERAL: PAINLEVEL: NO PAIN (0)

## 2023-11-27 NOTE — PATIENT INSTRUCTIONS
Start alendronate 70 mg/day  - take for 2-3 yrs  Get blood work today    Patient should take 1200mg of calcium/day in divided doses and vitamin D3 5000IU/day.  Calcium in food is best  If taking pills you don't absorb more than 5-600mg at a time  Look at your pills and serving size - might need 2 pills to get 600mg    DXA in 2 yrs 11/2025  See me after the next DXA

## 2023-11-28 LAB — ALP BONE SERPL-MCNC: 6.6 UG/L

## 2023-12-01 ENCOUNTER — TELEPHONE (OUTPATIENT)
Dept: FAMILY MEDICINE | Facility: CLINIC | Age: 68
End: 2023-12-01

## 2023-12-01 NOTE — TELEPHONE ENCOUNTER
Itinerary: [place], [place], [place], [place], [place], [place]  Departure Date: 12/30/23  Return Date: 3/15/23  Reason for Travel: Pleasure  Visiting an urban or rural area? Urban  Adventure travel?  Possible Safari  Accommodations: Cruise Line  Do you have copy of complete vaccine record? Yes    You will need a travel visit with one of the providers in the clinic. Does patient understand this: yes/no YES  Check with your insurance for coverage of a travel visit and coverage of vaccinations. Does patient understand this: yes/no YES    Addie is going on an educational cruise through [place], [place] and [place].  She has already studies the travel requirements she received from their cruise line and has received her most receive COVID and influenza vaccines, yellow fever, Hepatitis A and Typhoid.  Due to her age, however, she is required to have an EKG and medical clearance from her provider for which she needs a medical visit.      Recommended Vaccinations for Countries on Itinerary  COVID - 9/25/23  Hepatitis A - Done recently at pharmacy  Typhoid - Done recently at pharmacy  Yellow Fever - Done recently at pharmacy    Consider preventative Rabies booster per CDC    Recommended Medication Therapies   Malaria  Travelers Diarrhea    Health Alerts  Denge fever is also on the rise in [place] and [place]    Addie was at the pharmacy when I called her to discuss her travels.  She will call back shortly to schedule a travel appointment with Dr. Estrada to have EKG done and receive a letter for medical clearance to go on the trip.    NII SeeN, RN, CCM  RN Care Coordinator  [facility]  12/01/23  4:01 PM  Phone: [phone]

## 2023-12-05 ENCOUNTER — OFFICE VISIT (OUTPATIENT)
Dept: FAMILY MEDICINE | Facility: CLINIC | Age: 68
End: 2023-12-05
Payer: COMMERCIAL

## 2023-12-05 VITALS
BODY MASS INDEX: 23.8 KG/M2 | TEMPERATURE: 97 F | WEIGHT: 126.08 LBS | OXYGEN SATURATION: 96 % | HEIGHT: 61 IN | DIASTOLIC BLOOD PRESSURE: 70 MMHG | SYSTOLIC BLOOD PRESSURE: 117 MMHG | HEART RATE: 65 BPM

## 2023-12-05 DIAGNOSIS — F34.1 DYSTHYMIC DISORDER: ICD-10-CM

## 2023-12-05 DIAGNOSIS — M85.89 OSTEOPENIA OF MULTIPLE SITES: ICD-10-CM

## 2023-12-05 DIAGNOSIS — N95.2 ATROPHIC VAGINITIS: ICD-10-CM

## 2023-12-05 DIAGNOSIS — Z71.84 TRAVEL ADVICE ENCOUNTER: Primary | ICD-10-CM

## 2023-12-05 RX ORDER — AZITHROMYCIN 500 MG/1
1000 TABLET, FILM COATED ORAL
Qty: 4 TABLET | Refills: 0 | Status: SHIPPED | OUTPATIENT
Start: 2023-12-05 | End: 2024-03-27

## 2023-12-05 RX ORDER — ALENDRONATE SODIUM 70 MG/1
70 TABLET ORAL
Qty: 6 TABLET | Refills: 0 | Status: SHIPPED | OUTPATIENT
Start: 2023-12-05 | End: 2024-04-08

## 2023-12-05 RX ORDER — ONDANSETRON 4 MG/1
4 TABLET, ORALLY DISINTEGRATING ORAL EVERY 8 HOURS PRN
Qty: 15 TABLET | Refills: 0 | Status: SHIPPED | OUTPATIENT
Start: 2023-12-05 | End: 2024-03-27

## 2023-12-05 RX ORDER — SERTRALINE HYDROCHLORIDE 100 MG/1
TABLET, FILM COATED ORAL
Qty: 60 TABLET | Refills: 0 | Status: SHIPPED | OUTPATIENT
Start: 2023-12-05 | End: 2024-03-28

## 2023-12-05 RX ORDER — ATOVAQUONE AND PROGUANIL HYDROCHLORIDE 250; 100 MG/1; MG/1
TABLET, FILM COATED ORAL
COMMUNITY
Start: 2023-11-30 | End: 2024-03-27

## 2023-12-05 RX ORDER — ESTRADIOL 10 UG/1
INSERT VAGINAL
Qty: 40 TABLET | Refills: 0 | Status: SHIPPED | OUTPATIENT
Start: 2023-12-05 | End: 2024-06-20

## 2023-12-13 ENCOUNTER — DOCUMENTATION ONLY (OUTPATIENT)
Dept: OTHER | Facility: CLINIC | Age: 68
End: 2023-12-13
Payer: COMMERCIAL

## 2024-03-26 ENCOUNTER — TELEPHONE (OUTPATIENT)
Dept: FAMILY MEDICINE | Facility: CLINIC | Age: 69
End: 2024-03-26

## 2024-03-26 ENCOUNTER — MYC MEDICAL ADVICE (OUTPATIENT)
Dept: FAMILY MEDICINE | Facility: CLINIC | Age: 69
End: 2024-03-26

## 2024-03-26 DIAGNOSIS — Z01.00 ENCOUNTER FOR VISION SCREENING: Primary | ICD-10-CM

## 2024-03-26 DIAGNOSIS — N90.89 VULVAR LESION: Primary | ICD-10-CM

## 2024-03-26 DIAGNOSIS — H91.93 DECREASED HEARING, BILATERAL: ICD-10-CM

## 2024-03-26 DIAGNOSIS — L29.2 VULVAR ITCHING: ICD-10-CM

## 2024-03-26 NOTE — TELEPHONE ENCOUNTER
Placing referrals for ophthalmology and audiology per pt request after daughter made comment about her hearing.    EFRAIN Marie, RN  03/26/24, 4:07 PM

## 2024-03-26 NOTE — TELEPHONE ENCOUNTER
Referral placed to women's health clinic for evaluation of new vulvar lesions and prutitus, pt prefers to work with female provider. Provided pt contact information to schedule appointment.    Garrison ZUNIGA, RN  03/26/24 9:41 AM

## 2024-03-27 ENCOUNTER — OFFICE VISIT (OUTPATIENT)
Dept: OBGYN | Facility: CLINIC | Age: 69
End: 2024-03-27
Attending: FAMILY MEDICINE
Payer: COMMERCIAL

## 2024-03-27 VITALS
BODY MASS INDEX: 23.81 KG/M2 | WEIGHT: 124 LBS | HEART RATE: 84 BPM | OXYGEN SATURATION: 96 % | SYSTOLIC BLOOD PRESSURE: 142 MMHG | DIASTOLIC BLOOD PRESSURE: 72 MMHG

## 2024-03-27 DIAGNOSIS — R10.2 PELVIC PAIN IN FEMALE: ICD-10-CM

## 2024-03-27 DIAGNOSIS — L98.9 SKIN LESION: Primary | ICD-10-CM

## 2024-03-27 DIAGNOSIS — N89.8 VAGINAL DISCHARGE: ICD-10-CM

## 2024-03-27 DIAGNOSIS — N95.1 MENOPAUSAL VAGINAL DRYNESS: ICD-10-CM

## 2024-03-27 LAB
CLUE CELLS: ABNORMAL
TRICHOMONAS, WET PREP: ABNORMAL
WBC'S/HIGH POWER FIELD, WET PREP: ABNORMAL
YEAST, WET PREP: ABNORMAL

## 2024-03-27 PROCEDURE — 87210 SMEAR WET MOUNT SALINE/INK: CPT

## 2024-03-27 PROCEDURE — 99204 OFFICE O/P NEW MOD 45 MIN: CPT

## 2024-03-27 PROCEDURE — 87529 HSV DNA AMP PROBE: CPT

## 2024-03-27 RX ORDER — LIDOCAINE 50 MG/G
OINTMENT TOPICAL PRN
Qty: 30 G | Refills: 2 | Status: SHIPPED | OUTPATIENT
Start: 2024-03-27

## 2024-03-27 RX ORDER — VALACYCLOVIR HYDROCHLORIDE 500 MG/1
500 TABLET, FILM COATED ORAL DAILY
COMMUNITY
End: 2024-04-25

## 2024-03-27 RX ORDER — CLOBETASOL PROPIONATE 0.5 MG/G
OINTMENT TOPICAL 2 TIMES DAILY
Qty: 45 G | Refills: 2 | Status: SHIPPED | OUTPATIENT
Start: 2024-03-27 | End: 2024-04-25

## 2024-03-27 RX ORDER — SCOLOPAMINE TRANSDERMAL SYSTEM 1 MG/1
PATCH, EXTENDED RELEASE TRANSDERMAL
COMMUNITY
Start: 2023-12-11 | End: 2024-03-28

## 2024-03-27 RX ORDER — ESTRADIOL 0.1 MG/G
2 CREAM VAGINAL
Qty: 42.5 G | Refills: 4 | Status: SHIPPED | OUTPATIENT
Start: 2024-03-28

## 2024-03-27 NOTE — PROGRESS NOTES
"CC: new vaginal lesion/outbreak  S: Addie is a 69 yo  postmenopausal female here today for evaluation for a new vaginal lesion or outbreak  -Addie and her  recently returned from [place]   -starting in mid January while on the trip she noted skin changes in her vulva, she started experiencing significant pain, itching, and discomfort, she tried topical treatment with hydrocortisone and antifungal agents, she described it as appearing as if there were reddened open sores with little white bumps  -the pain was significantly exacerbated by urination  -also experienced UTI on this trip which cleared with abx  -the pain ultimately led her to seek treatment with on ship medical PA who did not have the capabilities for HSV pcr, put empirically treated with acyclovir for active outbreak, then transitioned to daily 500mg tablet of valacyclovir  -Addie is , monogamous, neither her or partner have had any hsv eruptions historically  -some scant vaginal discharge   -was diagnosed with probable lichen sclerosis a few years ago, had tissue biopsy and clobetosol 0.5 was used for treatment and improvement  -does use vagifem tablets for post menopausal vaginal dryness  -additional concerns was feeling sharp/achey LLQ pain and tenderness which was also new, no pmb or other sx    O:BP (!) 142/72   Pulse 84   Wt 56.2 kg (124 lb)   SpO2 96%   BMI 23.81 kg/m    No past medical history on file.  Past Surgical History:   Procedure Laterality Date    ZC  DELIVERY ONLY      Description:  Section;  Recorded: 2010;  Comments: x2    ZZC LIGATE FALLOPIAN TUBE      Description: Tubal Ligation;  Recorded: 2010;     Current Outpatient Medications   Medication    alendronate (FOSAMAX) 70 MG tablet    calcium carbonate (OS-NILE) 1500 (600 Ca) MG tablet    clobetasol (TEMOVATE) 0.05 % external ointment    clobetasol (TEMOVATE) 0.05 % external ointment    [START ON " 3/28/2024] estradiol (ESTRACE) 0.1 MG/GM vaginal cream    estradiol (VAGIFEM) 10 MCG TABS vaginal tablet    lidocaine (XYLOCAINE) 5 % external ointment    sertraline (ZOLOFT) 100 MG tablet    valACYclovir (VALTREX) 500 MG tablet    Vitamin D-Vitamin K (VITAMIN K2-VITAMIN D3 PO)    scopolamine (TRANSDERM) 1 MG/3DAYS 72 hr patch     No current facility-administered medications for this visit.        Allergies   Allergen Reactions    Other Allergy (See Comments) [External Allergen Needs Reconciliation - See Comment] Hives     Childhood reaction    Sulfa (Sulfonamide Antibiotics) [Sulfa Antibiotics] Unknown   Alert very pleasant female NAD  RRR  Normal bp and pulse  Pelvic Exam:  Vulva: starting at the labia majora to the hymenal ring up to the clitoral sorenson  there are many erythematous vulvar ulcerations, they very in size with irregular border, there is no bleeding, or discharge. The ulcerations have sharply demarcated borders with red eroded interior. There is normal hair distribution, no adenopathy. There are no fluid filled vesicles, or other obvious lesions. The skin overall appears to be very irrigated. Due to the level of ulcerations present it is difficult assess for any other skin changes, loss of pigmentation or other associated sx with her hx of lichen sclerosis, there are no skin changes into the perineal or rectal area    Vagina: dry, pale, scant amounts of white homogenous discharge, smooth, no lesions  Cervix: parous, smooth, pink, no visible lesions  Uterus: Normal size, anteverted, non-tender, mobile  Ovaries: not able to be assessed reports LLQ pain and tenderness    A/P:  1. Skin lesion  -discussed as far as differential dx this does not appear to be a typical HSV eruption, as well as she has taken considerable continuous dosing of acyclovir/valacyclovir and it has been persistent since January  -discussed using topical steroid cream twice daily externally for one mo  -recc adithya bottle with warm  water instead of wiping with toilet paper to reduce irritation  -plain epsom salt soaks once daily  -keep area clean and dry  -avoid tight fitting clothing  -topical lido for pain  -can stop valacyclovir, if fluid filled vesicles erupt call and be worked in to schedule with active [place] for HSV PCR  -discussed limited clinical utility with HSV1&2 blood work if not having known or symptomatic eruption or hx of eruption  -consider differentials of erosive lichen planus vs desquamative inflammatory vaginitis  -discussed potential need for tissue biopsy  - clobetasol (TEMOVATE) 0.05 % external ointment; Apply topically 2 times daily  Dispense: 45 g; Refill: 2  - lidocaine (XYLOCAINE) 5 % external ointment; Apply topically as needed for moderate pain  Dispense: 30 g; Refill: 2  - Herpes Simplex Virus 1&2 by PCR    2. Vaginal discharge  - Wet prep - Clinic Collect    3. Pelvic pain in female  -recc tvus to evaluate LLQ pain and tenderness  - US Pelvic Complete with Transvaginal; Future    4. Menopausal vaginal dryness  -plans to use remainder of vagifem tablets then switch over to cream preparation for cost saving and try alt preparation  - estradiol (ESTRACE) 0.1 MG/GM vaginal cream; Place 2 g vaginally twice a week  Dispense: 42.5 g; Refill: 4    Rtc 4-6 wks for skin check potential biopsy, or sooner if worsening condition.  complete TVUS at anytime  LENNOX Sandoval CNP

## 2024-03-28 DIAGNOSIS — F34.1 DYSTHYMIC DISORDER: ICD-10-CM

## 2024-03-28 LAB
HSV1 DNA SPEC QL NAA+PROBE: NOT DETECTED
HSV2 DNA SPEC QL NAA+PROBE: NOT DETECTED

## 2024-03-28 RX ORDER — SERTRALINE HYDROCHLORIDE 100 MG/1
TABLET, FILM COATED ORAL
Qty: 60 TABLET | Refills: 3 | Status: SHIPPED | OUTPATIENT
Start: 2024-03-28

## 2024-03-28 NOTE — TELEPHONE ENCOUNTER
Sertraline (Zoloft) 100 mg    Last Office Visit: 12/5/23  Future [facility] Appointments: None  Medication last refilled: 12/5/23 #60 with 0 refill(s)    PHQ-9 / DENEEN-7 Scores  11/9/22 11/13/23   DENEEN-7 Score DocFlow 0 0   PHQ-9 Score DocFlow 0 0     Prescription approved per [facility] Refill Protocol.    NII SeeN, RN, CCM

## 2024-04-01 ENCOUNTER — TRANSFERRED RECORDS (OUTPATIENT)
Dept: HEALTH INFORMATION MANAGEMENT | Facility: CLINIC | Age: 69
End: 2024-04-01
Payer: COMMERCIAL

## 2024-04-04 NOTE — PROGRESS NOTES
HPI:  Patient presents for an annual eye. Patient complains of difficulty driving at night.     Social history: I also see [facility]ie's  for eye exams.       Pertinent Medical History:  Basal cell carcinoma skin  Sarcoidosis  Possible HSV eruption. 03/27/2024  Erosive lichen planus    Ocular History:   Myopia, both eyes.   Family history of glaucoma - father late in life.   Family history of RP - niece.     Eye Medications:  Allergic to sulfa    Assessment and Plan:    #   Myopia, both eyes.   Glasses prescription given.     #   Cataract, both eyes.   Visually significant. Difficulty driving at night. Mild cataract - can monitor for now.   Recommend UV protection.   Recommend annual dilated eye exam.      #   Dry Age Related Macular Degeneration, left eye. Early.   Macular OCT 04/09/2024: Right eye: normal; Left eye: drusen, no SRF  Macular degeneration causes central vision blindness and there is no cure. The goal of initiating treatment is to slow down progression and to preserve vision.   Smoking status: When she was younger.   Family history of macular degeneration: uncle  AREDS 2 vitamins by mouth 2 times daily.   Recommend UV protection.   Recommend fish and green leafy vegetables 2-3 days per week.   Return immediately if there is waviness in vision or decrease in vision.   Return immediately if there are changes seen on amsler grid.  Given only the left eye - can do initial consultation with a retinal specialist here.   Follow up in 1 year with dilation and macular OCT - Dr. Khalil.     #   Blepharitis, both eyes.   Ocusoft lid wipes, 2 times daily, both eyes.     #   Posterior Vitreous Detachment, both eyes. Retinas attached.   Educated on signs and symptoms of a retinal detachment (ie. Hundreds of floaters, flashes of light, and shadow/curtain over the vision) to be seen immediately.            Patient consented to a dilated eye exam:    Yes. Side effects discussed.  Mood/affect: nice    Medical  History:  No past medical history on file.    Medications:  Current Outpatient Medications   Medication Sig Dispense Refill    alendronate (FOSAMAX) 70 MG tablet Take 1 tablet (70 mg) by mouth every 7 days 6 tablet 0    calcium carbonate (OS-NILE) 1500 (600 Ca) MG tablet Take 600 mg by mouth daily      clobetasol (TEMOVATE) 0.05 % external ointment Apply topically 2 times daily 45 g 2    clobetasol (TEMOVATE) 0.05 % external ointment APPLY A THIN LAYER TO AFFECTED AREA (S) TOPICALLY 2 TIMES PER DAY.      estradiol (ESTRACE) 0.1 MG/GM vaginal cream Place 2 g vaginally twice a week 42.5 g 4    estradiol (VAGIFEM) 10 MCG TABS vaginal tablet One tab vaginally twice a week 40 tablet 0    lidocaine (XYLOCAINE) 5 % external ointment Apply topically as needed for moderate pain 30 g 2    sertraline (ZOLOFT) 100 MG tablet TAKE 1 TABLET (100 MG TOTAL) BY MOUTH DAILY. 60 tablet 3    valACYclovir (VALTREX) 500 MG tablet Take 500 mg by mouth daily      Vitamin D-Vitamin K (VITAMIN K2-VITAMIN D3 PO) Take by mouth daily (Vit D 5000iu, Vit K 45mcg)     Complete documentation of historical and exam elements from today's encounter can be found in the full encounter summary report (not reduplicated in this progress note). I personally obtained the chief complaint(s) and history of present illness.  I confirmed and edited as necessary the review of systems, past medical/surgical history, family history, social history, and examination findings as documented by others; and I examined the patient myself. I personally reviewed the relevant tests, images, and reports as documented above. I formulated and edited as necessary the assessment and plan and discussed the findings and management plan with the patient and family. - Chantel Crook OD

## 2024-04-08 DIAGNOSIS — M85.89 OSTEOPENIA OF MULTIPLE SITES: ICD-10-CM

## 2024-04-09 ENCOUNTER — OFFICE VISIT (OUTPATIENT)
Dept: OPHTHALMOLOGY | Facility: CLINIC | Age: 69
End: 2024-04-09
Attending: OPTOMETRIST
Payer: COMMERCIAL

## 2024-04-09 DIAGNOSIS — H35.3121 EARLY DRY STAGE NONEXUDATIVE AGE-RELATED MACULAR DEGENERATION OF LEFT EYE: ICD-10-CM

## 2024-04-09 DIAGNOSIS — H01.01A ULCERATIVE BLEPHARITIS OF UPPER AND LOWER EYELIDS OF BOTH EYES: ICD-10-CM

## 2024-04-09 DIAGNOSIS — H25.13 NUCLEAR SENILE CATARACT OF BOTH EYES: ICD-10-CM

## 2024-04-09 DIAGNOSIS — H52.13 MYOPIA OF BOTH EYES: Primary | ICD-10-CM

## 2024-04-09 DIAGNOSIS — H01.01B ULCERATIVE BLEPHARITIS OF UPPER AND LOWER EYELIDS OF BOTH EYES: ICD-10-CM

## 2024-04-09 DIAGNOSIS — H43.813 VITREOUS DETACHMENT OF BOTH EYES: ICD-10-CM

## 2024-04-09 PROCEDURE — G0463 HOSPITAL OUTPT CLINIC VISIT: HCPCS | Performed by: OPTOMETRIST

## 2024-04-09 PROCEDURE — 92004 COMPRE OPH EXAM NEW PT 1/>: CPT | Performed by: OPTOMETRIST

## 2024-04-09 PROCEDURE — 92134 CPTRZ OPH DX IMG PST SGM RTA: CPT | Performed by: OPTOMETRIST

## 2024-04-09 RX ORDER — ALENDRONATE SODIUM 70 MG/1
70 TABLET ORAL
Qty: 6 TABLET | Refills: 0 | Status: SHIPPED | OUTPATIENT
Start: 2024-04-09 | End: 2024-05-07

## 2024-04-09 RX ORDER — ANTIOX #8/OM3/DHA/EPA/LUT/ZEAX 250-2.5 MG
1 CAPSULE ORAL 2 TIMES DAILY
Qty: 60 CAPSULE | Refills: 11 | Status: SHIPPED | OUTPATIENT
Start: 2024-04-09 | End: 2024-05-02

## 2024-04-09 RX ORDER — EYELID CLEANSER COMBINATION 13
1 PADS, MEDICATED (EA) TOPICAL 2 TIMES DAILY
Qty: 60 EACH | Refills: 11 | Status: SHIPPED | OUTPATIENT
Start: 2024-04-09

## 2024-04-09 ASSESSMENT — REFRACTION_WEARINGRX
OD_ADD: +2.75
OS_SPHERE: -0.75
OD_SPHERE: -1.50
OD_AXIS: 055
OS_CYLINDER: +0.75
OD_CYLINDER: +1.25
OS_ADD: +2.75
SPECS_TYPE: PAL
OS_AXIS: 092

## 2024-04-09 ASSESSMENT — CONF VISUAL FIELD
OS_NORMAL: 1
OS_INFERIOR_TEMPORAL_RESTRICTION: 0
OD_INFERIOR_TEMPORAL_RESTRICTION: 0
OD_SUPERIOR_TEMPORAL_RESTRICTION: 0
OS_SUPERIOR_TEMPORAL_RESTRICTION: 0
METHOD: COUNTING FINGERS
OS_SUPERIOR_NASAL_RESTRICTION: 0
OD_SUPERIOR_NASAL_RESTRICTION: 0
OD_NORMAL: 1
OS_INFERIOR_NASAL_RESTRICTION: 0
OD_INFERIOR_NASAL_RESTRICTION: 0

## 2024-04-09 ASSESSMENT — REFRACTION_MANIFEST
OD_ADD: +2.50
OD_AXIS: 047
OS_ADD: +2.50
OS_SPHERE: -0.75
OS_CYLINDER: +1.25
OD_SPHERE: -1.50
OD_CYLINDER: +1.50
OS_AXIS: 106

## 2024-04-09 ASSESSMENT — VISUAL ACUITY
OD_PH_CC: 20/25
CORRECTION_TYPE: GLASSES
OS_PH_CC: 20/30
METHOD: SNELLEN - LINEAR
OD_CC: 20/30
OD_PH_CC+: -2
OS_PH_CC+: -2
OS_CC: 20/40
OD_CC+: -2
OS_CC+: +2

## 2024-04-09 ASSESSMENT — TONOMETRY
OD_IOP_MMHG: 10
OS_IOP_MMHG: 10
IOP_METHOD: ICARE

## 2024-04-09 ASSESSMENT — EXTERNAL EXAM - LEFT EYE: OS_EXAM: NORMAL

## 2024-04-09 ASSESSMENT — EXTERNAL EXAM - RIGHT EYE: OD_EXAM: NORMAL

## 2024-04-09 ASSESSMENT — SLIT LAMP EXAM - LIDS
COMMENTS: BLEPHARITIS
COMMENTS: BLEPHARITIS

## 2024-04-09 NOTE — TELEPHONE ENCOUNTER
Alendronate (Fosamax) 70 mg    Last Office Visit: 12/5/23  [facility] Appointments: None  Medication last refilled: 12/5/23 #6 with 0 refill(s)    Required labs per protocol:    LAB REF RANGE 11/9/22 11/13/23   GFR >60 mL/min/1.73m2  >90 >90   Creatinine 0.67-1.17 mg/dL 0.72 0.72     Required imaging per protocol:  Last Dexa:  11/21/23    Prescription approved per [facility] Refill Protocol.    Adriana Klein, BSN, RN, CCM    
09-Apr-2024 17:54

## 2024-04-09 NOTE — NURSING NOTE
Chief Complaints and History of Present Illnesses   Patient presents with    COMPREHENSIVE EYE EXAM     New Pt CEE.     Chief Complaint(s) and History of Present Illness(es)       COMPREHENSIVE EYE EXAM              Laterality: both eyes    Onset: gradual    Onset: years ago    Associated symptoms: glare, haloes and dryness.  Negative for eye pain, tearing, flashes and floaters    Treatments tried: artificial tears    Pain scale: 0/10    Comments: New Pt CEE.              Comments    EMERY was about a year ago.  Pt states vision is about the same.  No pain.  New Dx of Erosive Lichen Planus in about 2 weeks ago.    No flashes/floaters.  Some dryness.  Glare/haloes, night driving is getting harder.  AT's rarely.    CORNEL Cueva April 9, 2024 12:46 PM

## 2024-04-23 ENCOUNTER — TRANSFERRED RECORDS (OUTPATIENT)
Dept: HEALTH INFORMATION MANAGEMENT | Facility: CLINIC | Age: 69
End: 2024-04-23
Payer: COMMERCIAL

## 2024-04-25 ENCOUNTER — OFFICE VISIT (OUTPATIENT)
Dept: OBGYN | Facility: CLINIC | Age: 69
End: 2024-04-25
Payer: COMMERCIAL

## 2024-04-25 ENCOUNTER — ANCILLARY PROCEDURE (OUTPATIENT)
Dept: ULTRASOUND IMAGING | Facility: CLINIC | Age: 69
End: 2024-04-25
Payer: COMMERCIAL

## 2024-04-25 ENCOUNTER — DOCUMENTATION ONLY (OUTPATIENT)
Dept: OBGYN | Facility: CLINIC | Age: 69
End: 2024-04-25

## 2024-04-25 VITALS
BODY MASS INDEX: 24 KG/M2 | RESPIRATION RATE: 16 BRPM | DIASTOLIC BLOOD PRESSURE: 71 MMHG | HEART RATE: 62 BPM | TEMPERATURE: 98.4 F | WEIGHT: 125 LBS | SYSTOLIC BLOOD PRESSURE: 122 MMHG

## 2024-04-25 DIAGNOSIS — R10.2 PELVIC PAIN IN FEMALE: ICD-10-CM

## 2024-04-25 DIAGNOSIS — L98.9 SKIN LESION: Primary | ICD-10-CM

## 2024-04-25 PROCEDURE — 76856 US EXAM PELVIC COMPLETE: CPT | Performed by: OBSTETRICS & GYNECOLOGY

## 2024-04-25 PROCEDURE — 99214 OFFICE O/P EST MOD 30 MIN: CPT

## 2024-04-25 PROCEDURE — 76830 TRANSVAGINAL US NON-OB: CPT | Performed by: OBSTETRICS & GYNECOLOGY

## 2024-04-25 RX ORDER — FLUOCINONIDE TOPICAL SOLUTION USP, 0.05% 0.5 MG/ML
SOLUTION TOPICAL
COMMUNITY
Start: 2024-04-03

## 2024-04-25 NOTE — PROGRESS NOTES
CC: pelvic pain, skin check  S: Addie is a 67 yo here today for follow up skin check, TUVS for left pelvic pain  -reports pelvic pain improved  -reports using the clobetasol ointment bid for appx 1 mo, some improvement of skin overall, however new ulcerations/skin breakdown, increase in itching, and a concern for fissure  -continuing self care, epsom salt, skin care  -has not restarted use of vaginal estrogen with acute skin concerns  -no pmb, no bowel habit changes, no discharge, or dysuria   -Overall these skin lesions have now been present since January  -has had lichen sclerosis dx made with tissue biopsy several years ago  -no new sexual partners, exposure or discharge, her  does not have any lesions    O:/71 (BP Location: Right arm, Patient Position: Sitting, Cuff Size: Adult Regular)   Pulse 62   Temp 98.4  F (36.9  C)   Resp 16   Wt 56.7 kg (125 lb)   BMI 24.00 kg/m    No past medical history on file.  Past Surgical History:   Procedure Laterality Date    [facility]  DELIVERY ONLY      Description:  Section;  Recorded: 2010;  Comments: x2    [facility] LIGATE FALLOPIAN TUBE      Description: Tubal Ligation;  Recorded: 2010;     Current Outpatient Medications   Medication Sig Dispense Refill    alendronate (FOSAMAX) 70 MG tablet Take 1 tablet (70 mg) by mouth every 7 days 6 tablet 0    calcium carbonate (OS-NILE) 1500 (600 Ca) MG tablet Take 600 mg by mouth daily      clobetasol (TEMOVATE) 0.05 % external ointment APPLY A THIN LAYER TO AFFECTED AREA (S) TOPICALLY 2 TIMES PER DAY.      estradiol (ESTRACE) 0.1 MG/GM vaginal cream Place 2 g vaginally twice a week 42.5 g 4    estradiol (VAGIFEM) 10 MCG TABS vaginal tablet One tab vaginally twice a week 40 tablet 0    Eyelid Cleansers (OCUSOFT EYELID CLEANSING) PADS Externally apply 1 Box topically 2 times daily 60 each 11    fluocinonide (LIDEX) 0.05 % external solution apply topically TO SCALP AT bedtime      lidocaine (XYLOCAINE)  5 % external ointment Apply topically as needed for moderate pain 30 g 2    Multiple Vitamins-Minerals (PRESERVISION AREDS 2) CAPS Take 1 capsule by mouth 2 times daily Take with food. 60 capsule 11    sertraline (ZOLOFT) 100 MG tablet TAKE 1 TABLET (100 MG TOTAL) BY MOUTH DAILY. 60 tablet 3    Vitamin D-Vitamin K (VITAMIN K2-VITAMIN D3 PO) Take by mouth daily (Vit D 5000iu, Vit K 45mcg)       No current facility-administered medications for this visit.        Allergies   Allergen Reactions    Other Allergy (See Comments) [External Allergen Needs Reconciliation - See Comment] Hives     Childhood reaction    Sulfa (Sulfonamide Antibiotics) [Sulfa Antibiotics] Unknown     Alert pleasant female NAD  RRR  Normal bp and pulse  Component      Latest Ref Rng 3/27/2024  11:48 AM 3/27/2024  11:56 AM   Trichomonas      Absent  Absent     Yeast      Absent  Absent     Clue cells      Absent  Absent     WBCs/high power field      None  1+ !     HSV Type 1 PCR      Not Detected   Not Detected    HSV Type 2 PCR      Not Detected   Not Detected       Legend:  ! Abnormal    Juliette Cloud on 2024 11:00 AM      Gynecological Ultrasound Report  Pelvic U/S - Transabdominal and Transvaginal   [facility]  Referring Provider: LENNOX Sandoval CNP   Sonographer:  Juliette Cloud RDMS  Indication: Pain- Pelvic pain Left  LMP: No LMP recorded. Patient is postmenopausal.  History: History of  x 2, tubal ligation   Gynecological Ultrasonography:   Uterus: anteverted. Contour is irregular w/ myomata: 1 Right Intramural 1.73 x 1.80 x 1.90 cm.  Size: 4.87 x 3.89 x 2.17 cm  Endometrium: Thickness Total 2.69 mm  Findings: Rt intramural fibroid appears to contact the canal.  There is an anterior ut wall  shadowing echogenic foci that measures 3 x 2.5 x 2.8 mm.  No obvious endo focal lesions observed.    Right Ovary: 2.13 x 1.35 x 1.16 cm. A few tiny calcifications observed  Left  Ovary: 1.74 x 1.17 x 1.00 cm. Wnl Prominent bowel loop observed in the left adnexa   Cul de Sac Free Fluid: No free fluid  Technique: Transvaginal Imaging performed  Transabdominal Imaging performed     Impression:         The ovaries were visualized and no abnormalities were seen.  Right intramural fibroid that appears to touch endometrium.  Endometrium is normal for postmenopausal status.  One calcified small nodule in the anterior uterine wall.  Prominent bowel loop in the left adnexal area.  Recommend clinical correlation.     HUNG DALE MD       Pelvic exam:  Vulva: starting at the labia majora to the hymenal ring up to the clitoral sorenson  there are many erythematous vulvar ulcerations, they very in size with irregular border, there is no bleeding, or discharge. The ulcerations have sharply demarcated borders with red eroded interior, white surrounding border. There is normal hair distribution, no adenopathy. There are no fluid filled vesicles, or other obvious lesions. The skin overall appears to be very erythematous and irritated. When comparing to last examination the ulcerations are overall in various healing stages on the left and right just entering the vaginal vault, there is a visible eraser size white plaque on the left interior of labia majora at 3 o'clock. There are very apparent changes to skin with loss of pigmentation, and thickness surrounding the labia majora that travels down to the perineum. There are new ulcerations that are in the interlabial fold on the right side that extend up to the adithya clitoral sorenson.     There are some raised papules on the left exterior groin, some appear to be clogged/irritated hair follicles   There are no vesicles or wart appearing lesions  Vagina: dry, pale, scant amounts of white homogenous discharge, smooth, no lesions    A/P:  1. Skin lesion  -discussed I do not believe this to be HSV or HPV type condylomata, she has had two partners, no hx STI/STD, no  exposures or contacts, and her partner asymptomatic and she took a considerable amount of valacyclovir/acyclovir without symptom resolution, as well as HSV PCR negative   -this eruption first started in January, although improved with clobetasol bid application we have not achieved meaningful disease remission/stabilization yet  -with erosive lesions more healed there is very apparent loss of skin pigmentation and skin thinning on vulva and perineum that would be consistent with her known diagnosis of lichen sclerosis  -other top differential includes erosive lichen planus  -not having discharge but could consider DIV as differential  -no GI hx or viral illness to consider crohns or Lipschutz ulcerations  -discussed with lichen disorders topical high potency steroids are standard of care, once disease remission obtained finding the right balance of suppressive therapy with medium potency steroids  -discussed referral to [facility] specialist  -discussed having MD CAMARENA take look at skin for further recc, discussed consideration of punch biopsy, could potentially give us more info, she has had punch biopsy in past and  understands its utility  -discussed ok to use estradiol cream, however it may cause stinging sensation on broken skin    2. Pelvic pain in female  -pelvic pain improved   -tvus- no focal findings explaining left pain. Small intramural fibroid, lining 2.69mm, small calcified nodule anterior wall could represent calcified fibroid, overall normal study  -discussed prominent bowel loop left adnexa, pain could be of GI origin, discussed GI workup, Addie will pursue if pain returns  -no bleeding, or abn discharge        Rtc for MD CAMARENA skin eval- [facility] referral sent  LENNOX Sandoval CNP

## 2024-04-30 ENCOUNTER — ORDERS ONLY (AUTO-RELEASED) (OUTPATIENT)
Dept: FAMILY MEDICINE | Facility: CLINIC | Age: 69
End: 2024-04-30

## 2024-04-30 ENCOUNTER — MYC MEDICAL ADVICE (OUTPATIENT)
Dept: FAMILY MEDICINE | Facility: CLINIC | Age: 69
End: 2024-04-30

## 2024-04-30 DIAGNOSIS — R73.09 ELEVATED GLUCOSE: ICD-10-CM

## 2024-04-30 DIAGNOSIS — Z12.11 SCREENING FOR COLON CANCER: ICD-10-CM

## 2024-04-30 DIAGNOSIS — Z12.11 SCREENING FOR COLON CANCER: Primary | ICD-10-CM

## 2024-05-02 ENCOUNTER — OFFICE VISIT (OUTPATIENT)
Dept: OBGYN | Facility: CLINIC | Age: 69
End: 2024-05-02
Payer: COMMERCIAL

## 2024-05-02 VITALS
BODY MASS INDEX: 24 KG/M2 | TEMPERATURE: 97.6 F | HEART RATE: 71 BPM | DIASTOLIC BLOOD PRESSURE: 65 MMHG | SYSTOLIC BLOOD PRESSURE: 114 MMHG | WEIGHT: 125 LBS

## 2024-05-02 DIAGNOSIS — L90.0 LICHEN SCLEROSUS: Primary | ICD-10-CM

## 2024-05-02 PROCEDURE — 99213 OFFICE O/P EST LOW 20 MIN: CPT | Performed by: OBSTETRICS & GYNECOLOGY

## 2024-05-02 ASSESSMENT — ANXIETY QUESTIONNAIRES
3. WORRYING TOO MUCH ABOUT DIFFERENT THINGS: NOT AT ALL
6. BECOMING EASILY ANNOYED OR IRRITABLE: NOT AT ALL
7. FEELING AFRAID AS IF SOMETHING AWFUL MIGHT HAPPEN: NOT AT ALL
2. NOT BEING ABLE TO STOP OR CONTROL WORRYING: NOT AT ALL
GAD7 TOTAL SCORE: 0
5. BEING SO RESTLESS THAT IT IS HARD TO SIT STILL: NOT AT ALL
1. FEELING NERVOUS, ANXIOUS, OR ON EDGE: NOT AT ALL
IF YOU CHECKED OFF ANY PROBLEMS ON THIS QUESTIONNAIRE, HOW DIFFICULT HAVE THESE PROBLEMS MADE IT FOR YOU TO DO YOUR WORK, TAKE CARE OF THINGS AT HOME, OR GET ALONG WITH OTHER PEOPLE: NOT DIFFICULT AT ALL
GAD7 TOTAL SCORE: 0

## 2024-05-02 ASSESSMENT — PATIENT HEALTH QUESTIONNAIRE - PHQ9
SUM OF ALL RESPONSES TO PHQ QUESTIONS 1-9: 1
5. POOR APPETITE OR OVEREATING: NOT AT ALL

## 2024-05-02 NOTE — PROGRESS NOTES
Assessment & Plan     Lichen sclerosus  Discussed with patient there seems to be significant interval improvement of vulvar ulcerations  Time course and gradual improvement on steroids consistent with lichen sclerosus flare.   I do not see evidence of lichen planus, which would typically improve more slowly.   I do not see indication for biopsy today, given continued improvement.   Recommend going down to nightly clobetasol and RTC 6 weeks. Clobetasol should extend around anal skin.  Ok for sexual intercourse - recommend silicone lubricant.   Ok for switch to estrace twice weekly when out of vagifem.       25 minutes spent by me on the date of the encounter doing chart review, history and exam, documentation and further activities per the note            No follow-ups on file.    Mansoor Real is a 68 year old, presenting for the following health issues:  Biopsy    HPI   Presents for evaluation of vulvar ulcerations.   Diagnosed while on a cruise and was told she had HSV. Treated, some minimal improvement.   Then started on steroid ointment 6 weeks ago for presumptive treatment of lichen sclerosus. Has improved and mostly resolved but not 100%. Still occasional itching.   Still using clobetasol twice a day.     Diagnosed with lichen sclerosus a few years ago.   Did have biopsy in  that showed benign kerotosis with hyperpigmentation.   Used clobetasol at that time and it went away.      Also doing epsom salts baths every other day.     Using vagifem twice a week long term. Switching soon to estrace for cost.       History reviewed. No pertinent past medical history.    Past Surgical History:   Procedure Laterality Date    [facility]  DELIVERY ONLY      Description:  Section;  Recorded: 2010;  Comments: x2    [facility] LIGATE FALLOPIAN TUBE      Description: Tubal Ligation;  Recorded: 2010;       Family History   Problem Relation Age of Onset    Chronic Obstructive Pulmonary Disease Mother      Heart Disease Mother     Glaucoma Father     Esophageal Cancer Father     Heart Disease Father     Hyperlipidemia Brother     Hypertension Brother     Heart Disease Brother     Thyroid nodules Daughter     Depression Daughter     Macular Degeneration No family hx of        Social History     Socioeconomic History    Marital status:      Spouse name: Not on file    Number of children: Not on file    Years of education: Not on file    Highest education level: Not on file   Occupational History    Not on file   Tobacco Use    Smoking status: Former     Current packs/day: 0.00     Types: Cigarettes     Quit date: 1975     Years since quittin.2     Passive exposure: Never    Smokeless tobacco: Never   Vaping Use    Vaping status: Never Used   Substance and Sexual Activity    Alcohol use: Not on file    Drug use: Not on file    Sexual activity: Not on file   Other Topics Concern    Not on file   Social History Narrative    Not on file     Social Determinants of Health     Financial Resource Strain: Low Risk  (2023)    Financial Resource Strain     Within the past 12 months, have you or your family members you live with been unable to get utilities (heat, electricity) when it was really needed?: No   Food Insecurity: Low Risk  (2023)    Food Insecurity     Within the past 12 months, did you worry that your food would run out before you got money to buy more?: No     Within the past 12 months, did the food you bought just not last and you didn t have money to get more?: No   Transportation Needs: Low Risk  (2023)    Transportation Needs     Within the past 12 months, has lack of transportation kept you from medical appointments, getting your medicines, non-medical meetings or appointments, work, or from getting things that you need?: No   Physical Activity: Not on file   Stress: Not on file   Social Connections: Not on file   Interpersonal Safety: Low Risk  (2023)    Interpersonal  Safety     Do you feel physically and emotionally safe where you currently live?: Yes     Within the past 12 months, have you been hit, slapped, kicked or otherwise physically hurt by someone?: No     Within the past 12 months, have you been humiliated or emotionally abused in other ways by your partner or ex-partner?: No   Housing Stability: Low Risk  (12/4/2023)    Housing Stability     Do you have housing? : Yes     Are you worried about losing your housing?: No       Current Outpatient Medications   Medication Sig Dispense Refill    alendronate (FOSAMAX) 70 MG tablet Take 1 tablet (70 mg) by mouth every 7 days 6 tablet 0    calcium carbonate (OS-NILE) 1500 (600 Ca) MG tablet Take 600 mg by mouth daily      clobetasol (TEMOVATE) 0.05 % external ointment APPLY A THIN LAYER TO AFFECTED AREA (S) TOPICALLY 2 TIMES PER DAY.      estradiol (ESTRACE) 0.1 MG/GM vaginal cream Place 2 g vaginally twice a week 42.5 g 4    estradiol (VAGIFEM) 10 MCG TABS vaginal tablet One tab vaginally twice a week 40 tablet 0    Eyelid Cleansers (OCUSOFT EYELID CLEANSING) PADS Externally apply 1 Box topically 2 times daily 60 each 11    fluocinonide (LIDEX) 0.05 % external solution apply topically TO SCALP AT bedtime      lidocaine (XYLOCAINE) 5 % external ointment Apply topically as needed for moderate pain 30 g 2    sertraline (ZOLOFT) 100 MG tablet TAKE 1 TABLET (100 MG TOTAL) BY MOUTH DAILY. 60 tablet 3    Vitamin D-Vitamin K (VITAMIN K2-VITAMIN D3 PO) Take by mouth daily (Vit D 5000iu, Vit K 45mcg)       No current facility-administered medications for this visit.          Allergies   Allergen Reactions    Other Allergy (See Comments) [External Allergen Needs Reconciliation - See Comment] Hives     Childhood reaction    Sulfa (Sulfonamide Antibiotics) [Sulfa Antibiotics] Unknown           Review of Systems  Constitutional, HEENT, cardiovascular, pulmonary, gi and gu systems are negative, except as otherwise noted.      Objective    BP  114/65   Pulse 71   Temp 97.6  F (36.4  C)   Wt 56.7 kg (125 lb)   BMI 24.00 kg/m    Body mass index is 24 kg/m .  Physical Exam   GENERAL: alert and no distress   (female): normal menopausal female external genitalia, normal urethral meatus, normal vaginal mucosa. No ulcerations present today. Still mild erythema, though sounds improved from previous exam. Plaque at left labia majus just slightly visible. Ulcerations at interlabial line are not visible, but improving erythema is there. Small scattered inclusion cysts.   MS: no gross musculoskeletal defects noted, no edema  PSYCH: mentation appears normal, affect normal/bright            Signed Electronically by: Pau Malone MD

## 2024-05-02 NOTE — Clinical Note
Looks much improved. No ulcerations today. Still residual erythema, but I could move all tissue around without risk of fissure or ulcer.  Not painful to pt.  I'm seeing her again in 6 months but I think LS flare   AO

## 2024-05-07 DIAGNOSIS — M85.89 OSTEOPENIA OF MULTIPLE SITES: ICD-10-CM

## 2024-05-07 RX ORDER — ALENDRONATE SODIUM 70 MG/1
70 TABLET ORAL
Qty: 12 TABLET | Refills: 1 | Status: SHIPPED | OUTPATIENT
Start: 2024-05-07

## 2024-05-07 NOTE — TELEPHONE ENCOUNTER
Alendronate (Fosamax) 70 mg    Last Office Visit: 12/5/23  [facility] Appointments: None  Medication last refilled: 4/9/24 #6 with 0 refill(s)    Required labs per protocol:    LAB REF RANGE 11/9/22 11/13/23   GFR >60 mL/min/1.73m2  >90 >90   Creatinine 0.67-1.17 mg/dL 0.72 0.72     Required imaging per protocol:  Last Dexa Scan:  11/21/23    Prescription approved per [facility] Refill Protocol.    Adriana Klein, NIIN, RN, CCM

## 2024-05-10 ENCOUNTER — LAB (OUTPATIENT)
Dept: LAB | Facility: CLINIC | Age: 69
End: 2024-05-10
Payer: COMMERCIAL

## 2024-05-10 DIAGNOSIS — R73.09 ELEVATED GLUCOSE: ICD-10-CM

## 2024-05-10 LAB — HBA1C MFR BLD: 5.7 % (ref 0–5.6)

## 2024-05-10 PROCEDURE — 36415 COLL VENOUS BLD VENIPUNCTURE: CPT | Mod: QW

## 2024-05-16 ENCOUNTER — OFFICE VISIT (OUTPATIENT)
Dept: AUDIOLOGY | Facility: CLINIC | Age: 69
End: 2024-05-16
Payer: COMMERCIAL

## 2024-05-16 DIAGNOSIS — H90.3 SENSORINEURAL HEARING LOSS (SNHL) OF BOTH EARS: Primary | ICD-10-CM

## 2024-05-16 PROCEDURE — 92557 COMPREHENSIVE HEARING TEST: CPT | Performed by: AUDIOLOGIST

## 2024-05-16 PROCEDURE — 92550 TYMPANOMETRY & REFLEX THRESH: CPT | Mod: 52 | Performed by: AUDIOLOGIST

## 2024-05-16 NOTE — PROGRESS NOTES
AUDIOLOGY REPORT    SUBJECTIVE:  Addie Carbone is a 68 year old female who was seen in the Audiology Clinic at the [facility] for audiologic evaluation, referred by Prashanth Estrada M.D. The patient reported that her daughter reportedly has concerns for the patient's hearing ability after extended travel together. Ms. Carbone feels she hears better using her right ear on the phone than when using the left ear. She reported occasional, non pulsatile tinnitus, which is likely bilateral, and occasional dizziness/lightheadedness upon arising from a seated or supine position; this usually lasts seconds only but sometimes longer. There is no reported nausea/vomiting or changes to hearing when dizzy. She denied otalgia, otorrhea, aural fullness, or history of significant noise exposure. She was accompanied today by her spouse, Stiven, who is also having hearing evaluation today.    OBJECTIVE:  Abuse Screening:  Do you feel unsafe at home or work/school? No  Do you feel threatened by someone? No  Does anyone try to keep you from having contact with others, or doing things outside of your home? No  Physical signs of abuse present? No     Fall Risk Screen:  1. Have you fallen two or more times in the past year? No  2. Have you fallen and had an injury in the past year? No    Is patient a fall risk? No  Referral initiated: No  Fall Risk Assessment Completed by Audiology    Otoscopic exam indicates ears are clear of cerumen, bilaterally.     Pure Tone Thresholds assessed using conventional audiometry with good  reliability from 250-8000 Hz bilaterally using insert earphones and circumaural headphones.     RIGHT:  normal sloping to moderate  sensorineural hearing loss    LEFT:    normal sloping to moderate sensorineural hearing loss    Tympanogram:    RIGHT: normal eardrum mobility    LEFT:   normal/hypermobile eardrum mobility    Reflexes for 1000 Hz (reported by stimulus ear):  RIGHT: Ipsilateral is present  at frequencies tested  RIGHT: Contralateral could not be assessed due to equipment issues  LEFT:   Ipsilateral is present at normal levels  LEFT:   Contralateral could not be assessed due to equipment issues      Speech Reception Threshold:    RIGHT: 15 dB HL    LEFT:   20 dB HL  Word Recognition Score:     RIGHT: 100% at 60 dB HL using NU-6 recorded word list.    LEFT:   100% at 60 dB HL using NU-6 recorded word list.      ASSESSMENT:     ICD-10-CM    1. Sensorineural hearing loss (SNHL) of both ears  H90.3           Today s results were discussed with the patient in detail. Appropriate communication strategies were discussed at length, as were reasonable expectations regarding hearing at a distance, in noisy environments, or when attention is diverted elsewhere.    PLAN:  Patient was counseled regarding hearing loss and impact on communication. Ms. Carbone is not yet an ideal candidate for amplification at this time for either ear.  Ms. Carbone should return for hearing evaluation annually to monitor current hearing thresholds. Wear hearing protection consistently in noise to preserve residual hearing sensitivity and to minimize the effects of noise on tinnitus. Ms. Carbone expressed verbal understanding of this information and plan. Please call this clinic with questions regarding these results or recommendations.        Augie Culp, [facility]  [place] Licensed Audiologist [id number]

## 2024-06-20 ENCOUNTER — OFFICE VISIT (OUTPATIENT)
Dept: OBGYN | Facility: CLINIC | Age: 69
End: 2024-06-20
Payer: COMMERCIAL

## 2024-06-20 VITALS
TEMPERATURE: 97.6 F | SYSTOLIC BLOOD PRESSURE: 106 MMHG | HEART RATE: 66 BPM | BODY MASS INDEX: 23.81 KG/M2 | DIASTOLIC BLOOD PRESSURE: 63 MMHG | WEIGHT: 124 LBS

## 2024-06-20 DIAGNOSIS — N95.1 MENOPAUSAL VAGINAL DRYNESS: ICD-10-CM

## 2024-06-20 DIAGNOSIS — L90.0 LICHEN SCLEROSUS: Primary | ICD-10-CM

## 2024-06-20 PROCEDURE — 99213 OFFICE O/P EST LOW 20 MIN: CPT | Performed by: OBSTETRICS & GYNECOLOGY

## 2024-06-20 NOTE — PROGRESS NOTES
Assessment & Plan     Lichen sclerosus  Decrease clobetasol to twice weekly  Increase to daily if flares    Menopausal vaginal dryness  Continue estrace twice weekly     RTC 6 months                No follow-ups on file.    Mansoor Real is a 68 year old, presenting for the following health issues:  lichen    HPI   Presents for follow-up of lichen sclerosus.   Last seen 24  Had move down to nightly clobetasol.   Switched from vagifem to estrace  Doing well.     History reviewed. No pertinent past medical history.    Past Surgical History:   Procedure Laterality Date    ZZC  DELIVERY ONLY      Description:  Section;  Recorded: 2010;  Comments: x2    ZZC LIGATE FALLOPIAN TUBE      Description: Tubal Ligation;  Recorded: 2010;       Family History   Problem Relation Age of Onset    Chronic Obstructive Pulmonary Disease Mother     Heart Disease Mother     Glaucoma Father     Esophageal Cancer Father     Heart Disease Father     Hyperlipidemia Brother     Hypertension Brother     Heart Disease Brother     Thyroid nodules Daughter     Depression Daughter     Macular Degeneration No family hx of        Social History     Socioeconomic History    Marital status:      Spouse name: Not on file    Number of children: Not on file    Years of education: Not on file    Highest education level: Not on file   Occupational History    Not on file   Tobacco Use    Smoking status: Former     Current packs/day: 0.00     Types: Cigarettes     Quit date: 1975     Years since quittin.3     Passive exposure: Never    Smokeless tobacco: Never   Vaping Use    Vaping status: Never Used   Substance and Sexual Activity    Alcohol use: Not on file    Drug use: Not on file    Sexual activity: Not on file   Other Topics Concern    Not on file   Social History Narrative    Not on file     Social Determinants of Health     Financial Resource Strain: Low Risk  (2023)    Financial Resource  Strain     Within the past 12 months, have you or your family members you live with been unable to get utilities (heat, electricity) when it was really needed?: No   Food Insecurity: Low Risk  (12/4/2023)    Food Insecurity     Within the past 12 months, did you worry that your food would run out before you got money to buy more?: No     Within the past 12 months, did the food you bought just not last and you didn t have money to get more?: No   Transportation Needs: Low Risk  (12/4/2023)    Transportation Needs     Within the past 12 months, has lack of transportation kept you from medical appointments, getting your medicines, non-medical meetings or appointments, work, or from getting things that you need?: No   Physical Activity: Not on file   Stress: Not on file   Social Connections: Not on file   Interpersonal Safety: Low Risk  (11/13/2023)    Interpersonal Safety     Do you feel physically and emotionally safe where you currently live?: Yes     Within the past 12 months, have you been hit, slapped, kicked or otherwise physically hurt by someone?: No     Within the past 12 months, have you been humiliated or emotionally abused in other ways by your partner or ex-partner?: No   Housing Stability: Low Risk  (12/4/2023)    Housing Stability     Do you have housing? : Yes     Are you worried about losing your housing?: No       Current Outpatient Medications   Medication Sig Dispense Refill    alendronate (FOSAMAX) 70 MG tablet Take 1 tablet (70 mg) by mouth every 7 days 12 tablet 1    calcium carbonate (OS-NILE) 1500 (600 Ca) MG tablet Take 600 mg by mouth daily      clobetasol (TEMOVATE) 0.05 % external ointment APPLY A THIN LAYER TO AFFECTED AREA (S) TOPICALLY 2 TIMES PER DAY.      estradiol (ESTRACE) 0.1 MG/GM vaginal cream Place 2 g vaginally twice a week 42.5 g 4    Eyelid Cleansers (OCUSOFT EYELID CLEANSING) PADS Externally apply 1 Box topically 2 times daily 60 each 11    fluocinonide (LIDEX) 0.05 % external  solution apply topically TO SCALP AT bedtime      lidocaine (XYLOCAINE) 5 % external ointment Apply topically as needed for moderate pain 30 g 2    sertraline (ZOLOFT) 100 MG tablet TAKE 1 TABLET (100 MG TOTAL) BY MOUTH DAILY. 60 tablet 3    Vitamin D-Vitamin K (VITAMIN K2-VITAMIN D3 PO) Take by mouth daily (Vit D 5000iu, Vit K 45mcg)      estradiol (VAGIFEM) 10 MCG TABS vaginal tablet One tab vaginally twice a week 40 tablet 0     No current facility-administered medications for this visit.          Allergies   Allergen Reactions    Other Allergy (See Comments) [External Allergen Needs Reconciliation - See Comment] Hives     Childhood reaction    Sulfa (Sulfonamide Antibiotics) [Sulfa Antibiotics] Unknown           Review of Systems  Constitutional, HEENT, cardiovascular, pulmonary, gi and gu systems are negative, except as otherwise noted.      Objective    /63   Pulse 66   Temp 97.6  F (36.4  C)   Wt 56.2 kg (124 lb)   BMI 23.81 kg/m    Body mass index is 23.81 kg/m .  Physical Exam   Physical Exam   GENERAL: alert and no distress   (female): normal menopausal female external genitalia, normal urethral meatus, normal vaginal mucosa. No ulcerations present today. Erythema improved from previous exam. No plaque at left labia majus  visible. Ulcerations at interlabial line are not visible, erythema also resolved. Small scattered inclusion cysts.   MS: no gross musculoskeletal defects noted, no edema  PSYCH: mentation appears normal, affect normal/bright            Signed Electronically by: Pau Malone MD

## 2024-06-25 LAB — NONINV COLON CA DNA+OCC BLD SCRN STL QL: NEGATIVE

## 2024-09-04 DIAGNOSIS — L90.0 LICHEN SCLEROSUS: Primary | ICD-10-CM

## 2024-09-04 RX ORDER — CLOBETASOL PROPIONATE 0.5 MG/G
OINTMENT TOPICAL
Qty: 45 G | Refills: 2 | Status: SHIPPED | OUTPATIENT
Start: 2024-09-05

## 2024-09-04 NOTE — TELEPHONE ENCOUNTER
6/20/24 Office visit with Dr Malone  Lichen sclerosus  Decrease clobetasol to twice weekly  Increase to daily if flares    Refill request from pharmacy, she is out of the medication at home. Last time she refilled was 3/28/21    Medication pended    Saba CARDOZA RN   [facility] Triage RN

## 2024-10-07 DIAGNOSIS — M85.89 OSTEOPENIA OF MULTIPLE SITES: ICD-10-CM

## 2024-10-08 RX ORDER — ALENDRONATE SODIUM 70 MG/1
70 TABLET ORAL
Qty: 12 TABLET | Refills: 0 | Status: SHIPPED | OUTPATIENT
Start: 2024-10-08

## 2024-10-08 NOTE — TELEPHONE ENCOUNTER
Medication requested: alendronate (FOSAMAX) 70 MG tablet   Last office visit: 12/5/23  [facility] appointments: none  Medication last refilled: 5/7/24; 12 + 1 refills  Last qualifying labs: N/A    Last DEXA 11/21/23    Prescription approved per [facility] Refill Protocol.    Garrison ZUNIGA, RN  10/08/24 4:04 PM

## 2024-11-14 DIAGNOSIS — F34.1 DYSTHYMIC DISORDER: ICD-10-CM

## 2024-11-14 RX ORDER — SERTRALINE HYDROCHLORIDE 100 MG/1
TABLET, FILM COATED ORAL
Qty: 30 TABLET | Refills: 0 | Status: SHIPPED | OUTPATIENT
Start: 2024-11-14

## 2024-11-14 NOTE — TELEPHONE ENCOUNTER
Sertraline (Zoloft) 100 mg    Last Office Visit: 12/5/23  [facility] [place]ointments: None  Medication last refilled: 3/28/24 #60 with 3 refill(s)    PHQ-9 / DENEEN-7 Scores 11/9/22 11/13/23 5/2/24   DENEEN-7 Score DocFlow 0 0 0   PHQ-9 Score DocFlow 0 0 1     Medication is being filled for 1 time refill only due to:  Patient needs to be seen because due for annual exam.    Aspida message sent to Addie to call and schedule an appointment.    Adriana Klein, NIIN, RN, CCM

## 2024-12-04 SDOH — HEALTH STABILITY: PHYSICAL HEALTH: ON AVERAGE, HOW MANY MINUTES DO YOU ENGAGE IN EXERCISE AT THIS LEVEL?: 20 MIN

## 2024-12-04 SDOH — HEALTH STABILITY: PHYSICAL HEALTH: ON AVERAGE, HOW MANY DAYS PER WEEK DO YOU ENGAGE IN MODERATE TO STRENUOUS EXERCISE (LIKE A BRISK WALK)?: 4 DAYS

## 2024-12-04 ASSESSMENT — SOCIAL DETERMINANTS OF HEALTH (SDOH): HOW OFTEN DO YOU GET TOGETHER WITH FRIENDS OR RELATIVES?: THREE TIMES A WEEK

## 2024-12-05 ENCOUNTER — OFFICE VISIT (OUTPATIENT)
Dept: FAMILY MEDICINE | Facility: CLINIC | Age: 69
End: 2024-12-05
Payer: COMMERCIAL

## 2024-12-05 VITALS
HEART RATE: 60 BPM | TEMPERATURE: 97.5 F | HEIGHT: 61 IN | BODY MASS INDEX: 23.22 KG/M2 | DIASTOLIC BLOOD PRESSURE: 69 MMHG | RESPIRATION RATE: 16 BRPM | OXYGEN SATURATION: 99 % | SYSTOLIC BLOOD PRESSURE: 132 MMHG | WEIGHT: 123 LBS

## 2024-12-05 DIAGNOSIS — R73.09 ELEVATED GLUCOSE: ICD-10-CM

## 2024-12-05 DIAGNOSIS — E78.00 HYPERCHOLESTEREMIA: ICD-10-CM

## 2024-12-05 DIAGNOSIS — Z00.00 WELLNESS EXAMINATION: Primary | ICD-10-CM

## 2024-12-05 DIAGNOSIS — Z12.31 ENCOUNTER FOR SCREENING MAMMOGRAM FOR BREAST CANCER: ICD-10-CM

## 2024-12-05 DIAGNOSIS — H30.149: ICD-10-CM

## 2024-12-05 DIAGNOSIS — F34.1 DYSTHYMIC DISORDER: ICD-10-CM

## 2024-12-05 DIAGNOSIS — M54.50 BILATERAL LOW BACK PAIN WITHOUT SCIATICA, UNSPECIFIED CHRONICITY: ICD-10-CM

## 2024-12-05 LAB
EST. AVERAGE GLUCOSE BLD GHB EST-MCNC: 111 MG/DL
HBA1C MFR BLD: 5.5 % (ref 0–5.6)

## 2024-12-05 PROCEDURE — 80061 LIPID PANEL: CPT | Mod: ORL | Performed by: FAMILY MEDICINE

## 2024-12-05 RX ORDER — SERTRALINE HYDROCHLORIDE 100 MG/1
TABLET, FILM COATED ORAL
Qty: 90 TABLET | Refills: 3 | Status: SHIPPED | OUTPATIENT
Start: 2024-12-05

## 2024-12-05 ASSESSMENT — ANXIETY QUESTIONNAIRES
5. BEING SO RESTLESS THAT IT IS HARD TO SIT STILL: NOT AT ALL
IF YOU CHECKED OFF ANY PROBLEMS ON THIS QUESTIONNAIRE, HOW DIFFICULT HAVE THESE PROBLEMS MADE IT FOR YOU TO DO YOUR WORK, TAKE CARE OF THINGS AT HOME, OR GET ALONG WITH OTHER PEOPLE: NOT DIFFICULT AT ALL
1. FEELING NERVOUS, ANXIOUS, OR ON EDGE: NOT AT ALL
4. TROUBLE RELAXING: NOT AT ALL
2. NOT BEING ABLE TO STOP OR CONTROL WORRYING: NOT AT ALL
3. WORRYING TOO MUCH ABOUT DIFFERENT THINGS: SEVERAL DAYS
8. IF YOU CHECKED OFF ANY PROBLEMS, HOW DIFFICULT HAVE THESE MADE IT FOR YOU TO DO YOUR WORK, TAKE CARE OF THINGS AT HOME, OR GET ALONG WITH OTHER PEOPLE?: NOT DIFFICULT AT ALL
GAD7 TOTAL SCORE: 2
GAD7 TOTAL SCORE: 2
7. FEELING AFRAID AS IF SOMETHING AWFUL MIGHT HAPPEN: SEVERAL DAYS
7. FEELING AFRAID AS IF SOMETHING AWFUL MIGHT HAPPEN: SEVERAL DAYS
GAD7 TOTAL SCORE: 2
6. BECOMING EASILY ANNOYED OR IRRITABLE: NOT AT ALL

## 2024-12-05 ASSESSMENT — PATIENT HEALTH QUESTIONNAIRE - PHQ9
SUM OF ALL RESPONSES TO PHQ QUESTIONS 1-9: 4
10. IF YOU CHECKED OFF ANY PROBLEMS, HOW DIFFICULT HAVE THESE PROBLEMS MADE IT FOR YOU TO DO YOUR WORK, TAKE CARE OF THINGS AT HOME, OR GET ALONG WITH OTHER PEOPLE: NOT DIFFICULT AT ALL
SUM OF ALL RESPONSES TO PHQ QUESTIONS 1-9: 4

## 2024-12-05 NOTE — NURSING NOTE
"69 year old  Chief Complaint   Patient presents with    Physical     Back pain, possible referral for PT.        Blood pressure 132/69, pulse 60, temperature 97.5  F (36.4  C), temperature source Oral, resp. rate 16, height 1.537 m (5' 0.51\"), weight 55.8 kg (123 lb), SpO2 99%. Body mass index is 23.62 kg/m .  Patient Active Problem List   Diagnosis    Basal Cell Carcinoma Of The Skin    Vitamin D Deficiency    Dysthymic Disorder    Mammogram - Abnormal    Sarcoidosis    Hemorrhoids    Lesion of vulva    Screening for colon cancer       Wt Readings from Last 2 Encounters:   12/05/24 55.8 kg (123 lb)   06/20/24 56.2 kg (124 lb)     BP Readings from Last 3 Encounters:   12/05/24 132/69   06/20/24 106/63   05/02/24 114/65         Current Outpatient Medications   Medication Sig Dispense Refill    alendronate (FOSAMAX) 70 MG tablet Take 1 tablet (70 mg) by mouth every 7 days. 12 tablet 0    calcium carbonate (OS-NILE) 1500 (600 Ca) MG tablet Take 600 mg by mouth daily      clobetasol (TEMOVATE) 0.05 % external ointment Apply topically twice a week. 45 g 2    estradiol (VAGIFEM) 10 MCG TABS vaginal tablet Place 1 tablet (10 mcg) vaginally twice a week. 24 tablet 3    Eyelid Cleansers (OCUSOFT EYELID CLEANSING) PADS Externally apply 1 Box topically 2 times daily 60 each 11    fluocinonide (LIDEX) 0.05 % external solution apply topically TO SCALP AT bedtime      lidocaine (XYLOCAINE) 5 % external ointment Apply topically as needed for moderate pain 30 g 2    sertraline (ZOLOFT) 100 MG tablet TAKE 1 TABLET (100 MG TOTAL) BY MOUTH DAILY. 30 tablet 0    Vitamin D-Vitamin K (VITAMIN K2-VITAMIN D3 PO) Take by mouth daily (Vit D 5000iu, Vit K 45mcg)      clobetasol (TEMOVATE) 0.05 % external ointment Apply topically twice a week      estradiol (ESTRACE) 0.1 MG/GM vaginal cream Place 2 g vaginally twice a week 42.5 g 4     No current facility-administered medications for this visit.       Social History     Tobacco Use    Smoking " "status: Former     Current packs/day: 0.00     Types: Cigarettes     Quit date: 1975     Years since quittin.8     Passive exposure: Never    Smokeless tobacco: Never   Vaping Use    Vaping status: Never Used       Health Maintenance Due   Topic Date Due    DEPRESSION ACTION PLAN  Never done    COVID-19 Vaccine ( season) 2024    MEDICARE ANNUAL WELLNESS VISIT  2024       No results found for: \"PAP\"      2024 1:39 PM   "

## 2024-12-05 NOTE — PROGRESS NOTES
Preventive Care Visit  [facility]  Prashanth Estrada MD, Family Medicine  Dec 5, 2024      Assessment & Plan   Problem List Items Addressed This Visit          Behavioral    Dysthymic Disorder    Relevant Medications    sertraline (ZOLOFT) 100 MG tablet     Other Visit Diagnoses       Wellness examination    -  Primary    Elevated glucose        Relevant Orders    Hemoglobin A1c    Hypercholesteremia        Relevant Orders    Lipid Profile    Encounter for screening mammogram for breast cancer        Relevant Orders    MA Screening Bilateral w/ Gerardo    Bilateral low back pain without sciatica, unspecified chronicity        Relevant Orders    Physical Therapy  Referral           Med refill as noted PHQ and DENEEN as noted below appear stable.    PT referral for ongoing back pain. No imaging at this point, but would consider this if symptoms worsen or fail to improve with PT.     Patient has been advised of split billing requirements and indicates understanding: Yes       Counseling  Appropriate preventive services were addressed with this patient via screening, questionnaire, or discussion as appropriate for fall prevention, nutrition, physical activity, Tobacco-use cessation, social engagement, weight loss and cognition.  Checklist reviewing preventive services available has been given to the patient.  Reviewed patient's diet, addressing concerns and/or questions.   She is at risk for psychosocial distress and has been provided with information to reduce risk.   Discussed possible causes of fatigue. The patient was provided with written information regarding signs of hearing loss.     Prashanth Estrada MD  2:24 PM, December 5, 2024        Mansoor Real is a 69 year old, presenting for the following:  Physical (Back pain, possible referral for PT. )      HPI  # Health Maintenance  - BP:   BP Readings from Last 3 Encounters:   12/05/24 132/69   06/20/24 106/63   05/02/24 114/65   - Cholesterol:  pending  Recent Labs   Lab Test 11/13/23  1009   CHOL 212*   HDL 71   *   TRIG 121   The 10-year ASCVD risk score (Jody WHITEHEAD, et al., 2019) is: 9.7%    Values used to calculate the score:      Age: 69 years      Sex: Female      Is Non- : No      Diabetic: No      Tobacco smoker: No      Systolic Blood Pressure: 132 mmHg      Is BP treated: No      HDL Cholesterol: 71 mg/dL      Total Cholesterol: 212 mg/dL  - Diabetes Screening: pending  - Lung Cancer Screening: not indicated  55-81yo w/30py smoking history and currently smoking OR quit within past 15 years:  Low dose CT annually and discontinued once a person has been 15 years tobacco free  - (+) seatbelt use, (+) helmet, (+) smoke detector  - Feels safe at home, denies verbal/physical/emotional abuse in past year: yes  - Colonoscopy: [facility] 2024 consider repeat in 2027  - Mammogram: 2023 referral placed today  - DEXA: 2023 consider repeat in 3 years        11/13/2023     9:35 AM 5/2/2024     3:06 PM 12/5/2024     1:29 PM   PHQ   PHQ-9 Total Score 0 1 4    Q9: Thoughts of better off dead/self-harm past 2 weeks Not at all Not at all Not at all        Patient-reported         11/13/2023     9:35 AM 5/2/2024     3:06 PM 12/5/2024     1:30 PM   DENEEN-7 SCORE   Total Score   2 (minimal anxiety)   Total Score 0 0 2        Patient-reported               Back Pain  - all summer and fall  - no memory of acute injury  - low RIGHT back without sciatica  - back muscles are all tight  - been to a chiropractor that helped before, didn't help most recently  - asking about possible referral to PT  - denies ref flag symptoms of LE weakness or numbness, no bowel or bladder incontinence, no saddle paresthesia    Health Care Directive  Patient has a Health Care Directive on file        12/4/2024   General Health   How would you rate your overall physical health? Good   Feel stress (tense, anxious, or unable to sleep) To some extent      (!) STRESS  CONCERN      12/4/2024   Nutrition   Diet: Regular (no restrictions)            12/4/2024   Exercise   Days per week of moderate/strenous exercise 4 days   Average minutes spent exercising at this level 20 min            12/4/2024   Social Factors   Frequency of gathering with friends or relatives Three times a week   Worry food won't last until get money to buy more No   Food not last or not have enough money for food? No   Do you have housing? (Housing is defined as stable permanent housing and does not include staying ouside in a car, in a tent, in an abandoned building, in an overnight shelter, or couch-surfing.) Yes   Are you worried about losing your housing? No   Lack of transportation? No   Unable to get utilities (heat,electricity)? No            12/4/2024   Fall Risk   Fallen 2 or more times in the past year? No    Trouble with walking or balance? No        Patient-reported          12/4/2024   Activities of Daily Living- Home Safety   Needs help with the following daily activites None of the above   Safety concerns in the home None of the above            12/4/2024   Dental   Dentist two times every year? Yes            12/4/2024   Hearing Screening   Hearing concerns? (!) IT'S HARD TO FOLLOW A CONVERSATION IN A NOISY RESTAURANT OR CROWDED ROOM.            12/4/2024   Driving Risk Screening   Patient/family members have concerns about driving No            12/4/2024   General Alertness/Fatigue Screening   Have you been more tired than usual lately? (!) YES            12/4/2024   Urinary Incontinence Screening   Bothered by leaking urine in past 6 months No            12/4/2024   TB Screening   Were you born outside of the [place]? No          Today's PHQ-9 Score:       12/5/2024     1:29 PM   PHQ-9 SCORE   PHQ-9 Total Score MyChart 4 (Minimal depression)   PHQ-9 Total Score 4        Patient-reported         12/4/2024   Substance Use   Alcohol more than 3/day or more than 7/wk No   Do you have a current opioid  prescription? No   How severe/bad is pain from 1 to 10? 3/10   Do you use any other substances recreationally? (!) CANNABIS PRODUCTS        Social History     Tobacco Use    Smoking status: Former     Current packs/day: 0.00     Types: Cigarettes     Quit date: 1975     Years since quittin.8     Passive exposure: Never    Smokeless tobacco: Never   Vaping Use    Vaping status: Never Used           2023   LAST FHS-7 RESULTS   1st degree relative breast or ovarian cancer No   Any relative bilateral breast cancer No   Any male have breast cancer No   Any ONE woman have BOTH breast AND ovarian cancer No   Any woman with breast cancer before 50yrs No   2 or more relatives with breast AND/OR ovarian cancer No   2 or more relatives with breast AND/OR bowel cancer No        No past medical history on file.  Past Surgical History:   Procedure Laterality Date    ZZC  DELIVERY ONLY      Description:  Section;  Recorded: 2010;  Comments: x2    ZZC LIGATE FALLOPIAN TUBE      Description: Tubal Ligation;  Recorded: 2010;     Current providers sharing in care for this patient include:  Patient Care Team:  Prashanth Estrada MD as PCP - General (Family Medicine)  Mesfin Fisher MD as MD (Neurology)  Prashanth Estrada MD as Assigned PCP  Holly Pierce APRN CNP as Nurse Practitioner (OB/Gyn)  Dee Olivia AuD as Audiologist (Audiology)  Chantel Khalil Chi, OD as Assigned Surgical Provider  Pau Malone MD as Assigned OBGYN Provider    The following health maintenance items are reviewed in Epic and correct as of today:  Health Maintenance   Topic Date Due    DEPRESSION ACTION PLAN  Never done    COVID-19 Vaccine ( season) 2024    MEDICARE ANNUAL WELLNESS VISIT  2024    PHQ-9  2025    MAMMO SCREENING  2025    FALL RISK ASSESSMENT  2025    GLUCOSE  2026    COLORECTAL CANCER SCREENING  2027    LIPID  2028    ADVANCE CARE  "PLANNING  12/13/2028    DTAP/TDAP/TD IMMUNIZATION (4 - Td or Tdap) 06/25/2029    RSV VACCINE (1 - 1-dose 75+ series) 11/25/2029    DEXA  11/21/2038    HEPATITIS C SCREENING  Completed    INFLUENZA VACCINE  Completed    Pneumococcal Vaccine: 65+ Years  Completed    ZOSTER IMMUNIZATION  Completed    HPV IMMUNIZATION  Aged Out    MENINGITIS IMMUNIZATION  Aged Out    RSV MONOCLONAL ANTIBODY  Aged Out     Family History   Problem Relation Age of Onset    Chronic Obstructive Pulmonary Disease Mother     Heart Disease Mother     Glaucoma Father     Esophageal Cancer Father     Heart Disease Father     Hyperlipidemia Brother     Hypertension Brother     Heart Disease Brother     Thyroid nodules Daughter     Depression Daughter     Macular Degeneration No family hx of          Review of Systems  Constitutional, HEENT, cardiovascular, pulmonary, gi and gu systems are negative, except as otherwise noted.     Objective    Exam  /69 (BP Location: Left arm, Patient Position: Sitting, Cuff Size: Adult Regular)   Pulse 60   Temp 97.5  F (36.4  C) (Oral)   Resp 16   Ht 1.537 m (5' 0.51\")   Wt 55.8 kg (123 lb)   SpO2 99%   BMI 23.62 kg/m     Estimated body mass index is 23.62 kg/m  as calculated from the following:    Height as of this encounter: 1.537 m (5' 0.51\").    Weight as of this encounter: 55.8 kg (123 lb).    Physical Exam  GENERAL: alert and no distress  NECK: no adenopathy, no asymmetry, masses, or scars  RESP: lungs clear to auscultation - no rales, rhonchi or wheezes  CV: regular rate and rhythm, normal S1 S2, no S3 or S4, no murmur, click or rub, no peripheral edema  ABDOMEN: soft, nontender, no hepatosplenomegaly, no masses and bowel sounds normal  MS: no gross musculoskeletal defects noted, no edema        12/5/2024   Mini Cog   Clock Draw Score 2 Normal   3 Item Recall 3 objects recalled   Mini Cog Total Score 5                 Signed Electronically by: Prashanth Estrada MD    Answers submitted by the " patient for this visit:  Patient Health Questionnaire (Submitted on 12/5/2024)  If you checked off any problems, how difficult have these problems made it for you to do your work, take care of things at home, or get along with other people?: Not difficult at all  PHQ9 TOTAL SCORE: 4  Patient Health Questionnaire (G7) (Submitted on 12/5/2024)  DENEEN 7 TOTAL SCORE: 2

## 2024-12-09 ENCOUNTER — OFFICE VISIT (OUTPATIENT)
Dept: OBGYN | Facility: CLINIC | Age: 69
End: 2024-12-09
Payer: COMMERCIAL

## 2024-12-09 VITALS
HEART RATE: 65 BPM | SYSTOLIC BLOOD PRESSURE: 115 MMHG | BODY MASS INDEX: 23.62 KG/M2 | OXYGEN SATURATION: 99 % | WEIGHT: 123 LBS | DIASTOLIC BLOOD PRESSURE: 72 MMHG

## 2024-12-09 DIAGNOSIS — L90.0 LICHEN SCLEROSUS: ICD-10-CM

## 2024-12-09 DIAGNOSIS — N95.8 GENITOURINARY SYNDROME OF MENOPAUSE: Primary | ICD-10-CM

## 2024-12-09 DIAGNOSIS — M85.89 OSTEOPENIA OF MULTIPLE SITES: ICD-10-CM

## 2024-12-09 PROCEDURE — 99214 OFFICE O/P EST MOD 30 MIN: CPT | Performed by: OBSTETRICS & GYNECOLOGY

## 2024-12-09 RX ORDER — ESTRADIOL 10 UG/1
10 INSERT VAGINAL DAILY
Qty: 14 TABLET | Refills: 0 | Status: SHIPPED | OUTPATIENT
Start: 2024-12-09 | End: 2024-12-23

## 2024-12-09 RX ORDER — ALENDRONATE SODIUM 70 MG/1
70 TABLET ORAL
Qty: 12 TABLET | Refills: 3 | Status: SHIPPED | OUTPATIENT
Start: 2024-12-09

## 2024-12-09 NOTE — PROGRESS NOTES
Assessment & Plan     Genitourinary syndrome of menopause  Still signs of inadequate treatment.   Recommend repeating initial 14 day course then transition back to twice weekly  RTC 2 months  - estradiol (VAGIFEM) 10 MCG TABS vaginal tablet; Place 1 tablet (10 mcg) vaginally daily for 14 days.    Lichen sclerosus  Again some uncontrolled symptoms and signs of inadequate treatment.   Return to 6 weeks of daily clobetasol, up to 8 while she is traveling to avoid recurrence while abroad.   RTC 2 months     Osteopenia of multiple sites  Discussed I typically do not manage osteopenia.   Reviewed note by Dr. Magdalene Butterfield from 2023.   She recommended return to alendronate for 2-3 years then seeing her again in 2025 after next DEX  Refills given.   - alendronate (FOSAMAX) 70 MG tablet; Take 1 tablet (70 mg) by mouth every 7 days.                No follow-ups on file.    Subjective   Addie is a 69 year old, presenting for the following health issues:  Follow Up    HPI   Presents for follow-up lichen sclerosus and genitourinary syndrome of menopause.   Last seen 2024  Still having daily lichen sclerosus symptoms. Feels irritated. Some itching. Using clobetasol about every other day. If she doesn't use it she feels raw.  Also having trouble with lubrication. Switched to cream, then switched back to tablets because the cream burned.       No past medical history on file.    Past Surgical History:   Procedure Laterality Date    ZZC  DELIVERY ONLY      Description:  Section;  Recorded: 2010;  Comments: x2    ZZC LIGATE FALLOPIAN TUBE      Description: Tubal Ligation;  Recorded: 2010;       Family History   Problem Relation Age of Onset    Chronic Obstructive Pulmonary Disease Mother     Heart Disease Mother     Glaucoma Father     Esophageal Cancer Father     Heart Disease Father     Hyperlipidemia Brother     Hypertension Brother     Heart Disease Brother     Thyroid nodules Daughter      Depression Daughter     Macular Degeneration No family hx of        Social History     Socioeconomic History    Marital status:      Spouse name: Not on file    Number of children: Not on file    Years of education: Not on file    Highest education level: Not on file   Occupational History    Not on file   Tobacco Use    Smoking status: Former     Current packs/day: 0.00     Types: Cigarettes     Quit date: 1975     Years since quittin.8     Passive exposure: Never    Smokeless tobacco: Never   Vaping Use    Vaping status: Never Used   Substance and Sexual Activity    Alcohol use: Not on file    Drug use: Not on file    Sexual activity: Not on file   Other Topics Concern    Not on file   Social History Narrative    Not on file     Social Drivers of Health     Financial Resource Strain: Low Risk  (2024)    Financial Resource Strain     Within the past 12 months, have you or your family members you live with been unable to get utilities (heat, electricity) when it was really needed?: No   Food Insecurity: Low Risk  (2024)    Food Insecurity     Within the past 12 months, did you worry that your food would run out before you got money to buy more?: No     Within the past 12 months, did the food you bought just not last and you didn t have money to get more?: No   Transportation Needs: Low Risk  (2024)    Transportation Needs     Within the past 12 months, has lack of transportation kept you from medical appointments, getting your medicines, non-medical meetings or appointments, work, or from getting things that you need?: No   Physical Activity: Insufficiently Active (2024)    Exercise Vital Sign     Days of Exercise per Week: 4 days     Minutes of Exercise per Session: 20 min   Stress: Stress Concern Present (2024)    [employer] [place] [employer] - Occupational Stress Questionnaire     Feeling of Stress : To some extent   Social Connections: Unknown (2024)     Social Connection and Isolation Panel [NHANES]     Frequency of Communication with Friends and Family: Not on file     Frequency of Social Gatherings with Friends and Family: Three times a week     Attends Jew Services: Not on file     Active Member of Clubs or Organizations: Not on file     Attends Club or Organization Meetings: Not on file     Marital Status: Not on file   Interpersonal Safety: Low Risk  (12/5/2024)    Interpersonal Safety     Do you feel physically and emotionally safe where you currently live?: Yes     Within the past 12 months, have you been hit, slapped, kicked or otherwise physically hurt by someone?: No     Within the past 12 months, have you been humiliated or emotionally abused in other ways by your partner or ex-partner?: No   Housing Stability: Low Risk  (12/4/2024)    Housing Stability     Do you have housing? : Yes     Are you worried about losing your housing?: No       Current Outpatient Medications   Medication Sig Dispense Refill    alendronate (FOSAMAX) 70 MG tablet Take 1 tablet (70 mg) by mouth every 7 days. 12 tablet 0    calcium carbonate (OS-NILE) 1500 (600 Ca) MG tablet Take 600 mg by mouth daily      clobetasol (TEMOVATE) 0.05 % external ointment Apply topically twice a week. 45 g 2    estradiol (VAGIFEM) 10 MCG TABS vaginal tablet Place 1 tablet (10 mcg) vaginally twice a week. 24 tablet 3    Eyelid Cleansers (OCUSOFT EYELID CLEANSING) PADS Externally apply 1 Box topically 2 times daily 60 each 11    fluocinonide (LIDEX) 0.05 % external solution apply topically TO SCALP AT bedtime      lidocaine (XYLOCAINE) 5 % external ointment Apply topically as needed for moderate pain 30 g 2    sertraline (ZOLOFT) 100 MG tablet TAKE 1 TABLET (100 MG TOTAL) BY MOUTH DAILY. 90 tablet 3    Vitamin D-Vitamin K (VITAMIN K2-VITAMIN D3 PO) Take by mouth daily (Vit D 5000iu, Vit K 45mcg)       No current facility-administered medications for this visit.          Allergies   Allergen  Reactions    Other Allergy (See Comments) [External Allergen Needs Reconciliation - See Comment] Hives     Childhood reaction    Sulfa (Sulfonamide Antibiotics) [Sulfa Antibiotics] Unknown           Review of Systems  Constitutional, HEENT, cardiovascular, pulmonary, gi and gu systems are negative, except as otherwise noted.      Objective    /72 (BP Location: Left arm, Patient Position: Sitting, Cuff Size: Adult Regular)   Pulse 65   Wt 55.8 kg (123 lb)   SpO2 99%   BMI 23.62 kg/m    Body mass index is 23.62 kg/m .  Physical Exam   GENERAL: alert and no distress   (female): normal menopausal female external genitalia, normal urethral meatus with exception of small urethral caruncle, vaginal mucosa pale with some small petechia. No ulcerations present today. Erythema improved from previous exam. No plaque at left labia majus  visible. Ulcerations at interlabial line are not visible, erythema now mildly retured. Small scattered inclusion cysts.   MS: no gross musculoskeletal defects noted, no edema  PSYCH: mentation appears normal, affect normal/bright            Signed Electronically by: Pau Malone MD

## 2024-12-11 ENCOUNTER — THERAPY VISIT (OUTPATIENT)
Dept: PHYSICAL THERAPY | Facility: CLINIC | Age: 69
End: 2024-12-11
Attending: FAMILY MEDICINE
Payer: COMMERCIAL

## 2024-12-11 DIAGNOSIS — M54.50 BILATERAL LOW BACK PAIN WITHOUT SCIATICA, UNSPECIFIED CHRONICITY: ICD-10-CM

## 2024-12-11 PROCEDURE — 97161 PT EVAL LOW COMPLEX 20 MIN: CPT | Mod: GP

## 2024-12-11 PROCEDURE — 97110 THERAPEUTIC EXERCISES: CPT | Mod: GP

## 2024-12-11 NOTE — PROGRESS NOTES
PHYSICAL THERAPY EVALUATION  Type of Visit: Evaluation       Fall Risk Screen:  Fall screen completed by: PT  Have you fallen 2 or more times in the past year?: (Patient-Rptd) No  Have you fallen and had an injury in the past year?: (Patient-Rptd) No  Is patient a fall risk?: No    Subjective      Condition type:  Chronic (continuous duration <3 months)  Cause of current episode:  Unspecified     Nature of treatment:  Rehabilitative  Functional ability:  Moderate functional limitations  Documented POC (choose all that apply):  Measurable short and long term/discharge treatment goals related to physical and functional deficits.;Frequency of treatment visits and treatment activities to address deficit areas.;Patient agrees to program participation including home program  Briefly describe symptoms:  right sided low back pain  How did the symptoms start:  insideous  Average pain/intensity last 24 hours:  4/10  Average pain/intensity past week:  4/10  Frequency of Symptoms:  Intermittently (0-25% of the time)  Symptom impact on ADLs:  Moderately  Condition change since eval:  N/A (first visit)  General health reported by patient:  Good      Presenting condition or subjective complaint: (Patient-Rptd) mainly lower back pain that has persisted for at least 6 months. Saw a chiropractor without results after several treatments. LBP limited her ability to bike through the summer. This LBP is different that past because it is unilateral and longer lasting, her chiropractor was able to resolve previous LBP. Reports insidious onset. States her hip exercises have been aggravating her back pain. A recent gynecology visit noted increased R pelvic floor muscle tension. Notes increased constipation more recently and urinary urgency.   Date of onset: 12/05/24 (date of order)    Relevant medical history: (Patient-Rptd) Menopause; Pain at night or rest; Vision problems, lichens sclerosis   Dates & types of surgery: (Patient-Rptd)  thoracic biopsy , C Sections  and     Prior diagnostic imaging/testing results:       Prior therapy history for the same diagnosis, illness or injury: (Patient-Rptd) Yes (Patient-Rptd) chiropractic treatments      Living Environment  Social support: (Patient-Rptd) With a significant other or spouse   Type of home: (Patient-Rptd) House   Stairs to enter the home: (Patient-Rptd) Yes (Patient-Rptd) 5 Is there a railing: (Patient-Rptd) Yes     Ramp: (Patient-Rptd) No   Stairs inside the home: (Patient-Rptd) Yes (Patient-Rptd) 16 Is there a railing: (Patient-Rptd) Yes     Help at home: (Patient-Rptd) None  Equipment owned:       Employment: (Patient-Rptd) No    Hobbies/Interests: (Patient-Rptd) walking, biking, cross country skiing, reading, drawing    Patient goals for therapy: (Patient-Rptd) I would like to do my normal physical activities without pain.    Pain assessment: Pain present  Location: R low back /Ratin/10     Objective   LUMBAR SPINE EVALUATION  PAIN: Pain Level at Rest: 0/10  Pain Level with Use: 5/10  Pain Location: lumbar spine and right hip and glute   Pain Quality: Aching and catches with tension  Pain Frequency: intermittent or constant  Pain is Worst: daytime  Pain is Exacerbated By: prolonged positions including sitting and standing, can stand comfortably up to 1hr  Pain is Relieved By: heat, otc medications, rest, and stretch  Pain Progression: Unchanged    ROM:   Lumbar   Flexion: reaches floor  Extension: min limited, feels tension  SG: mod limited, tension both directions     PELVIC/SI SCREEN:   (-) pelvic compression and distraction, R thigh thrust. (+) L thigh thrust    PELVIS/SI SPECIAL TESTS:   Supine to sit test: R anterior pelvic rotation    Assessment & Plan   CLINICAL IMPRESSIONS  Medical Diagnosis: Bilateral low back pain without sciatica, unspecified chronicity.    Treatment Diagnosis: Bilateral low back pain without sciatica, unspecified chronicity.    Impression/Assessment: Patient is a 69 year old female with low back pain, pelvic floor tension, reduced ability to perform desired activities due to pain complaints.  The following significant findings have been identified: Pain, Decreased ROM/flexibility, Impaired muscle performance, and Decreased activity tolerance. These impairments interfere with their ability to perform self care tasks, recreational activities, and community mobility as compared to previous level of function.     Clinical Decision Making (Complexity):  Clinical Presentation: Stable/Uncomplicated  Clinical Presentation Rationale: based on medical and personal factors listed in PT evaluation  Clinical Decision Making (Complexity): Low complexity    PLAN OF CARE  Treatment Interventions:  Modalities: Biofeedback, Cryotherapy, Hot Pack, Ultrasound  Interventions: Manual Therapy, Neuromuscular Re-education, Therapeutic Activity, Therapeutic Exercise, Self-Care/Home Management    Long Term Goals     PT Goal 1  Goal Identifier: LTG  Goal Description: Patient will be able to stand 2 hours without rest break without an increase in symptoms after standing in order 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  Rationale: to maximize safety and independence with performance of ADLs and functional tasks;to maximize safety and independence within the community  Target Date: 03/04/25  PT Goal 2  Goal Identifier: LBP  Goal Description: Patient will report <2/10 average low back pain for at least 2 weeks with desired activities to show improved symptoms  Rationale: to maximize safety and independence with performance of ADLs and functional tasks  Target Date: 03/04/25      Frequency of Treatment: 1x per week for 2 weeks, 1x/2 weeks for 12 weeks, adjusting frequency as indicated by patient presentation  Duration of Treatment: 12 weeks    Recommended Referrals to Other Professionals:   Education Assessment:   Learner/Method: Patient    Risks and benefits of  evaluation/treatment have been explained.   Patient/Family/caregiver agrees with Plan of Care.     Evaluation Time:     PT Eval, Low Complexity Minutes (75086): 23       Signing Clinician: DILMA Baldwin [facility]                                                                                   OUTPATIENT PHYSICAL THERAPY      PLAN OF TREATMENT FOR OUTPATIENT REHABILITATION   Patient's Last Name, First Name, Addie Nunez YOB: 1955   Provider's Name   [facility]   Medical Record No.  [id number]     Onset Date: 12/05/24 (date of order)  Start of Care Date: 12/11/24     Medical Diagnosis:  Bilateral low back pain without sciatica, unspecified chronicity.      PT Treatment Diagnosis:  Bilateral low back pain without sciatica, unspecified chronicity. Plan of Treatment  Frequency/Duration: 1x per week for 2 weeks, 1x/2 weeks for 12 weeks, adjusting frequency as indicated by patient presentation/ 12 weeks    Certification date from 12/11/24 to 03/04/25         See note for plan of treatment details and functional goals     Priyanka Goodrich, PT                         I CERTIFY THE NEED FOR THESE SERVICES FURNISHED UNDER        THIS PLAN OF TREATMENT AND WHILE UNDER MY CARE     (Physician attestation of this document indicates review and certification of the therapy plan).              Referring Provider:  Prashanth Estrada    Initial Assessment  See Epic Evaluation- Start of Care Date: 12/11/24                 Melvina Chairez R.N.

## 2024-12-16 ENCOUNTER — MYC MEDICAL ADVICE (OUTPATIENT)
Dept: FAMILY MEDICINE | Facility: CLINIC | Age: 69
End: 2024-12-16

## 2024-12-16 DIAGNOSIS — E78.00 HYPERCHOLESTEREMIA: Primary | ICD-10-CM

## 2024-12-18 NOTE — TELEPHONE ENCOUNTER
Placing order for CT Coronary Calcium Scan per Dr. Estrada. Scheduling information provided to pt.    EFRAIN Marie, RN  12/18/24, 11:56 AM

## 2024-12-19 ENCOUNTER — THERAPY VISIT (OUTPATIENT)
Dept: PHYSICAL THERAPY | Facility: CLINIC | Age: 69
End: 2024-12-19
Payer: COMMERCIAL

## 2024-12-19 DIAGNOSIS — M54.50 BILATERAL LOW BACK PAIN WITHOUT SCIATICA, UNSPECIFIED CHRONICITY: Primary | ICD-10-CM

## 2024-12-23 ENCOUNTER — ANCILLARY PROCEDURE (OUTPATIENT)
Dept: MAMMOGRAPHY | Facility: CLINIC | Age: 69
End: 2024-12-23
Attending: FAMILY MEDICINE
Payer: COMMERCIAL

## 2024-12-23 DIAGNOSIS — Z12.31 ENCOUNTER FOR SCREENING MAMMOGRAM FOR BREAST CANCER: ICD-10-CM

## 2024-12-23 PROCEDURE — 77063 BREAST TOMOSYNTHESIS BI: CPT | Mod: TC | Performed by: STUDENT IN AN ORGANIZED HEALTH CARE EDUCATION/TRAINING PROGRAM

## 2024-12-23 PROCEDURE — 77067 SCR MAMMO BI INCL CAD: CPT | Mod: TC | Performed by: STUDENT IN AN ORGANIZED HEALTH CARE EDUCATION/TRAINING PROGRAM

## 2025-01-02 ENCOUNTER — THERAPY VISIT (OUTPATIENT)
Dept: PHYSICAL THERAPY | Facility: CLINIC | Age: 70
End: 2025-01-02
Payer: COMMERCIAL

## 2025-01-02 DIAGNOSIS — M54.50 BILATERAL LOW BACK PAIN WITHOUT SCIATICA, UNSPECIFIED CHRONICITY: Primary | ICD-10-CM

## 2025-01-10 ENCOUNTER — HOSPITAL ENCOUNTER (OUTPATIENT)
Dept: CT IMAGING | Facility: CLINIC | Age: 70
Discharge: HOME | End: 2025-01-10
Attending: FAMILY MEDICINE | Admitting: FAMILY MEDICINE
Payer: COMMERCIAL

## 2025-01-10 DIAGNOSIS — E78.00 HYPERCHOLESTEREMIA: ICD-10-CM

## 2025-01-10 PROCEDURE — 75571 CT HRT W/O DYE W/CA TEST: CPT

## 2025-01-14 ENCOUNTER — THERAPY VISIT (OUTPATIENT)
Dept: PHYSICAL THERAPY | Facility: CLINIC | Age: 70
End: 2025-01-14
Payer: COMMERCIAL

## 2025-01-14 DIAGNOSIS — M54.50 BILATERAL LOW BACK PAIN WITHOUT SCIATICA, UNSPECIFIED CHRONICITY: Primary | ICD-10-CM

## 2025-01-14 PROCEDURE — 97110 THERAPEUTIC EXERCISES: CPT | Mod: GP

## 2025-01-14 PROCEDURE — 97530 THERAPEUTIC ACTIVITIES: CPT | Mod: GP

## 2025-02-24 ENCOUNTER — MYC MEDICAL ADVICE (OUTPATIENT)
Dept: OBGYN | Facility: CLINIC | Age: 70
End: 2025-02-24
Payer: COMMERCIAL

## 2025-02-24 NOTE — TELEPHONE ENCOUNTER
Patient having lichens flare up - thinks one area is open.  She can't remember if she needed an appointment for a swab if there was an open sore?    12/9/24 appt - I dont see anything mentioning a swab at this visit.    Does she need to be seen sooner than March 17th?    Any other recommendations in the meantime?    Myra Saucedo, RN

## 2025-02-25 NOTE — TELEPHONE ENCOUNTER
I recommend daily use of clobetasol until appt with Dr. Malone on 3/17. Can also use vaseline, zinc oxide (diaper cream) or aquaphor as skin protectant during other parts of the day. If area has not healed by 3/17, Dr. Malone will likely do a biopsy (not a swab).     Chary Lim MD

## 2025-02-26 ENCOUNTER — THERAPY VISIT (OUTPATIENT)
Dept: PHYSICAL THERAPY | Facility: CLINIC | Age: 70
End: 2025-02-26
Payer: COMMERCIAL

## 2025-02-26 DIAGNOSIS — M54.50 BILATERAL LOW BACK PAIN WITHOUT SCIATICA, UNSPECIFIED CHRONICITY: Primary | ICD-10-CM

## 2025-02-26 PROCEDURE — 97110 THERAPEUTIC EXERCISES: CPT | Mod: GP

## 2025-03-17 ENCOUNTER — OFFICE VISIT (OUTPATIENT)
Dept: OBGYN | Facility: CLINIC | Age: 70
End: 2025-03-17
Payer: COMMERCIAL

## 2025-03-17 ENCOUNTER — TRANSFERRED RECORDS (OUTPATIENT)
Dept: HEALTH INFORMATION MANAGEMENT | Facility: CLINIC | Age: 70
End: 2025-03-17

## 2025-03-17 VITALS
OXYGEN SATURATION: 98 % | SYSTOLIC BLOOD PRESSURE: 116 MMHG | BODY MASS INDEX: 23.83 KG/M2 | WEIGHT: 124.1 LBS | HEART RATE: 66 BPM | DIASTOLIC BLOOD PRESSURE: 61 MMHG

## 2025-03-17 DIAGNOSIS — L90.0 LICHEN SCLEROSUS: Primary | ICD-10-CM

## 2025-03-17 PROCEDURE — 99214 OFFICE O/P EST MOD 30 MIN: CPT | Performed by: OBSTETRICS & GYNECOLOGY

## 2025-03-17 PROCEDURE — 3074F SYST BP LT 130 MM HG: CPT | Performed by: OBSTETRICS & GYNECOLOGY

## 2025-03-17 PROCEDURE — 3078F DIAST BP <80 MM HG: CPT | Performed by: OBSTETRICS & GYNECOLOGY

## 2025-03-17 RX ORDER — TACROLIMUS 1 MG/G
OINTMENT TOPICAL
Qty: 60 G | Refills: 1 | Status: SHIPPED | OUTPATIENT
Start: 2025-03-17

## 2025-03-17 NOTE — Clinical Note
Christina sent referral to [facility] on 4/2024 but the patient didn't do it. It looks like it's scanned. Can the date be renewed and resent?  Thanks

## 2025-03-17 NOTE — PROGRESS NOTES
Assessment & Plan     Lichen sclerosus  At this point - has not responded adequately to ultra potent steroid ointment.   Recommend transition to tacrolimus ointment.   If that is not successful may need injected medications.   May need biopsy which has been avoided due to fragile tissue. No focal lesions.  Discussed referral to [facility] or [facility], which she would like to pursue.   - tacrolimus (PROTOPIC) 0.1 % external ointment; Apply pea sized drop to affected area nightly for 6 weeks then return to clinic.                No follow-ups on file.    Subjective   Addie is a 69 year old, presenting for the following health issues:  Follow Up    HPI    Presents for followup lichen sclerosus versus lichen planus  Last seen 24  Since then has been doing progressively worse.   Has been using clobetasol daily.   Feels burning, itching, irritation  Did use a topical product for her 's ED which seems to have made things worse but used it only once. Had a lot of pain after intercourse and has not tried again.       No past medical history on file.    Past Surgical History:   Procedure Laterality Date    ZZC  DELIVERY ONLY      Description:  Section;  Recorded: 2010;  Comments: x2    ZZC LIGATE FALLOPIAN TUBE      Description: Tubal Ligation;  Recorded: 2010;       Family History   Problem Relation Age of Onset    Chronic Obstructive Pulmonary Disease Mother     Heart Disease Mother     Glaucoma Father     Esophageal Cancer Father     Heart Disease Father     Hyperlipidemia Brother     Hypertension Brother     Heart Disease Brother     Thyroid nodules Daughter     Depression Daughter     Macular Degeneration No family hx of        Social History     Socioeconomic History    Marital status:      Spouse name: Not on file    Number of children: Not on file    Years of education: Not on file    Highest education level: Not on file   Occupational History    Not on  file   Tobacco Use    Smoking status: Former     Current packs/day: 0.00     Types: Cigarettes     Quit date: 1975     Years since quittin.1     Passive exposure: Never    Smokeless tobacco: Never   Vaping Use    Vaping status: Never Used   Substance and Sexual Activity    Alcohol use: Not on file    Drug use: Not on file    Sexual activity: Not on file   Other Topics Concern    Not on file   Social History Narrative    Not on file     Social Drivers of Health     Financial Resource Strain: Low Risk  (2024)    Financial Resource Strain     Within the past 12 months, have you or your family members you live with been unable to get utilities (heat, electricity) when it was really needed?: No   Food Insecurity: Low Risk  (2024)    Food Insecurity     Within the past 12 months, did you worry that your food would run out before you got money to buy more?: No     Within the past 12 months, did the food you bought just not last and you didn t have money to get more?: No   Transportation Needs: Low Risk  (2024)    Transportation Needs     Within the past 12 months, has lack of transportation kept you from medical appointments, getting your medicines, non-medical meetings or appointments, work, or from getting things that you need?: No   Physical Activity: Insufficiently Active (2024)    Exercise Vital Sign     Days of Exercise per Week: 4 days     Minutes of Exercise per Session: 20 min   Stress: Stress Concern Present (2024)    [employer] - Occupational Stress Questionnaire     Feeling of Stress : To some extent   Social Connections: Unknown (2024)    Social Connection and Isolation Panel [NHANES]     Frequency of Communication with Friends and Family: Not on file     Frequency of Social Gatherings with Friends and Family: Three times a week     Attends Muslim Services: Not on file     Active Member of Clubs or Organizations: Not on file     Attends  Club or Organization Meetings: Not on file     Marital Status: Not on file   Interpersonal Safety: Low Risk  (12/11/2024)    Interpersonal Safety     Do you feel physically and emotionally safe where you currently live?: Yes     Within the past 12 months, have you been hit, slapped, kicked or otherwise physically hurt by someone?: No     Within the past 12 months, have you been humiliated or emotionally abused in other ways by your partner or ex-partner?: No   Housing Stability: Low Risk  (12/4/2024)    Housing Stability     Do you have housing? : Yes     Are you worried about losing your housing?: No       Current Outpatient Medications   Medication Sig Dispense Refill    alendronate (FOSAMAX) 70 MG tablet Take 1 tablet (70 mg) by mouth every 7 days. 12 tablet 3    calcium carbonate (OS-NILE) 1500 (600 Ca) MG tablet Take 600 mg by mouth daily      clobetasol (TEMOVATE) 0.05 % external ointment Apply topically twice a week. 45 g 2    estradiol (VAGIFEM) 10 MCG TABS vaginal tablet Place 1 tablet (10 mcg) vaginally twice a week. 24 tablet 3    Eyelid Cleansers (OCUSOFT EYELID CLEANSING) PADS Externally apply 1 Box topically 2 times daily 60 each 11    fluocinonide (LIDEX) 0.05 % external solution apply topically TO SCALP AT bedtime      lidocaine (XYLOCAINE) 5 % external ointment Apply topically as needed for moderate pain 30 g 2    sertraline (ZOLOFT) 100 MG tablet TAKE 1 TABLET (100 MG TOTAL) BY MOUTH DAILY. 90 tablet 3    tacrolimus (PROTOPIC) 0.1 % external ointment Apply pea sized drop to affected area nightly for 6 weeks then return to clinic. 60 g 1    Vitamin D-Vitamin K (VITAMIN K2-VITAMIN D3 PO) Take by mouth daily (Vit D 5000iu, Vit K 45mcg)       No current facility-administered medications for this visit.          Allergies   Allergen Reactions    Other Allergy (See Comments) [External Allergen Needs Reconciliation - See Comment] Hives     Childhood reaction    Sulfa (Sulfonamide Antibiotics) [Sulfa  Antibiotics] Unknown           Review of Systems  Constitutional, HEENT, cardiovascular, pulmonary, gi and gu systems are negative, except as otherwise noted.      Objective    /61 (BP Location: Left arm, Patient Position: Sitting, Cuff Size: Adult Regular)   Pulse 66   Wt 56.3 kg (124 lb 1.6 oz)   SpO2 98%   BMI 23.83 kg/m    Body mass index is 23.83 kg/m .  Physical Exam   GENERAL: alert and no distress   (female): normal urethral meatus with exception of small urethral caruncle, vaginal mucosa pale with some small petechia. No ulcerations present today. There is diffuse erythema throughout the vestibule surrounded by white plaque circumferentially to the periphery .   MS: no gross musculoskeletal defects noted, no edema    Pictures were taken through [employer] but did not save to patient to patient chart through tech error.        Signed Electronically by: Pau Malone MD

## 2025-03-20 ENCOUNTER — THERAPY VISIT (OUTPATIENT)
Dept: PHYSICAL THERAPY | Facility: CLINIC | Age: 70
End: 2025-03-20
Payer: COMMERCIAL

## 2025-03-20 DIAGNOSIS — M54.50 BILATERAL LOW BACK PAIN WITHOUT SCIATICA, UNSPECIFIED CHRONICITY: Primary | ICD-10-CM

## 2025-03-21 NOTE — PROGRESS NOTES
[facility]                                                                                   OUTPATIENT PHYSICAL THERAPY    PLAN OF TREATMENT FOR OUTPATIENT REHABILITATION   Patient's Last Name, First Name, Addie Nunez YOB: 1955   Provider's Name   [facility]   Medical Record No.  [id number]     Onset Date: 12/05/24 (date of order)  Start of Care Date: 12/11/24     Medical Diagnosis:  Bilateral low back pain without sciatica, unspecified chronicity.      PT Treatment Diagnosis:  Bilateral low back pain without sciatica, unspecified chronicity. Plan of Treatment  Frequency/Duration: 1x per week for 2 weeks, 1x/2 weeks for 12 weeks, adjusting frequency as indicated by patient presentation/ 12 weeks    Certification date from 03/05/25 to 05/27/25         See note for plan of treatment details and functional goals     Priyanka Goodrich, PT                         I CERTIFY THE NEED FOR THESE SERVICES FURNISHED UNDER        THIS PLAN OF TREATMENT AND WHILE UNDER MY CARE     (Physician attestation of this document indicates review and certification of the therapy plan).              Referring Provider:  Prashanth Estrada    Initial Assessment  See Epic Evaluation- Start of Care Date: 12/11/24            PLAN  Continue therapy per current plan of care. Patient reports increase in left hip pain after recent acute injury without known trauma or cause. Upon testing, L hip symptoms are correlated with LBP, recommend continued episode of care to improve functional mobility and pain symptoms.     Beginning/End Dates of Progress Note Reporting Period:  12/11/24 to 03/20/2025    Referring Provider:  Prashanth Estrada

## 2025-04-07 ENCOUNTER — MYC MEDICAL ADVICE (OUTPATIENT)
Dept: FAMILY MEDICINE | Facility: CLINIC | Age: 70
End: 2025-04-07

## 2025-04-07 DIAGNOSIS — M54.50 LUMBAR PAIN: Primary | ICD-10-CM

## 2025-04-07 DIAGNOSIS — M25.552 HIP PAIN, LEFT: ICD-10-CM

## 2025-04-07 NOTE — TELEPHONE ENCOUNTER
Persistent low back and LEFT hip pain as discussed in MyChart correspondence. Imaging as ordered below.     Diagnoses and all orders for this visit:    Lumbar pain  -     Cancel: MR Hip Left w/o Contrast; Future  -     Cancel: MRI Lumbar spine w/o contrast; Future  -     MR Lumbar Spine w/o & w Contrast; Future  -     MR Hip Left w/o & w Contrast; Future    Hip pain, left  -     Cancel: MR Hip Left w/o Contrast; Future  -     Cancel: MRI Lumbar spine w/o contrast; Future  -     MR Lumbar Spine w/o & w Contrast; Future  -     MR Hip Left w/o & w Contrast; Future      Prashanth Estrada MD  5:35 PM, April 7, 2025

## 2025-04-11 ENCOUNTER — MYC MEDICAL ADVICE (OUTPATIENT)
Dept: OBGYN | Facility: CLINIC | Age: 70
End: 2025-04-11
Payer: COMMERCIAL

## 2025-04-17 ENCOUNTER — OFFICE VISIT (OUTPATIENT)
Dept: URGENT CARE | Facility: URGENT CARE | Age: 70
End: 2025-04-17
Payer: COMMERCIAL

## 2025-04-17 VITALS
DIASTOLIC BLOOD PRESSURE: 71 MMHG | SYSTOLIC BLOOD PRESSURE: 116 MMHG | OXYGEN SATURATION: 95 % | WEIGHT: 123 LBS | RESPIRATION RATE: 16 BRPM | HEIGHT: 61 IN | HEART RATE: 72 BPM | BODY MASS INDEX: 23.22 KG/M2 | TEMPERATURE: 97.9 F

## 2025-04-17 DIAGNOSIS — R31.9 HEMATURIA, UNSPECIFIED TYPE: Primary | ICD-10-CM

## 2025-04-17 LAB
ALBUMIN UR-MCNC: NEGATIVE MG/DL
ANION GAP SERPL CALCULATED.3IONS-SCNC: 11 MMOL/L (ref 3–14)
APPEARANCE UR: CLEAR
BILIRUB UR QL STRIP: NEGATIVE
BUN SERPL-MCNC: 13 MG/DL (ref 7–30)
CALCIUM SERPL-MCNC: 10 MG/DL (ref 8.5–10.1)
CHLORIDE BLD-SCNC: 104 MMOL/L (ref 94–109)
CO2 SERPL-SCNC: 28 MMOL/L (ref 20–32)
COLOR UR AUTO: YELLOW
CREAT SERPL-MCNC: 0.8 MG/DL (ref 0.52–1.04)
EGFRCR SERPLBLD CKD-EPI 2021: 79 ML/MIN/1.73M2
GLUCOSE BLD-MCNC: 104 MG/DL (ref 70–99)
GLUCOSE UR STRIP-MCNC: NEGATIVE MG/DL
HGB UR QL STRIP: NEGATIVE
KETONES UR STRIP-MCNC: NEGATIVE MG/DL
LEUKOCYTE ESTERASE UR QL STRIP: NEGATIVE
NITRATE UR QL: NEGATIVE
PH UR STRIP: 6.5 [PH] (ref 5–7)
POTASSIUM BLD-SCNC: 4.4 MMOL/L (ref 3.4–5.3)
SODIUM SERPL-SCNC: 143 MMOL/L (ref 135–145)
SP GR UR STRIP: 1.01 (ref 1–1.03)
UROBILINOGEN UR STRIP-ACNC: 0.2 E.U./DL

## 2025-04-17 NOTE — PATIENT INSTRUCTIONS
Please reach out to us if you develop any classic symptoms of urinary tract infection such as pain with urination, cloudy urine, increased urinary frequency      Your urine was sent out for further testing called a urine culture we will call you if abnormal      Stay hydrated drink 60 ounces of water/fluids a day      If going into early next week you continue to have episodes where you notice blood in your urine please reach out to your doctors office they will consider seeing you in clinic or referring you to urology       Hypercalcemia

## 2025-04-17 NOTE — PROGRESS NOTES
Assessment & Plan     Hematuria, unspecified type  ***  - Urine Culture Aerobic Bacterial  - Basic metabolic panel  (Ca, Cl, CO2, Creat, Gluc, K, Na, BUN)     UA unremarkable, symptoms are not consistent with UTI  Urine culture was ordered today in addition to checking renal function    If patient has continued symptoms of hematuria consider referral to urology or if appropriate acute diagnostic services.  Patient was instructed to reach out to PCP early next week if so      Neri Sheikh MD   [place] [facility]    Mansoor Real is a 69 year old female who presents to clinic today for the following health issues:  Chief Complaint   Patient presents with    Urgent Care    Urinary Problem     X3 days of blood in urine in the mornings      HPI    Patient previously smoked quit more than 2 decades ago.  She has no history of kidney stone she has not any flank pain, nausea or vomiting.     Had a urinary tract infection about a year ago when [place] was on the boat although at this time she denies any dysuria/increased frequency/cloudy urine.  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     Priority: Medium    Screening for colon cancer 04/30/2024     Priority: Medium    Lesion of vulva 04/29/2022     Priority: Medium    Vitamin D Deficiency      Priority: Medium     Created by Conversion  Replacement Utility updated for latest IMO load        Mammogram - Abnormal      Priority: Medium     Created by Conversion  Replacement Utility updated for latest IMO load        Hemorrhoids       "Priority: Medium     Created by Conversion  Replacement Utility updated for latest IMO load        Basal Cell Carcinoma Of The Skin      Priority: Medium     Created by Conversion        Dysthymic Disorder      Priority: Medium     Created by Conversion        Sarcoidosis      Priority: Medium     Created by Conversion           Current Outpatient Medications   Medication Sig Dispense Refill    alendronate (FOSAMAX) 70 MG tablet Take 1 tablet (70 mg) by mouth every 7 days. 12 tablet 3    calcium carbonate (OS-NILE) 1500 (600 Ca) MG tablet Take 600 mg by mouth daily      clobetasol (TEMOVATE) 0.05 % external ointment Apply topically twice a week. 45 g 2    estradiol (VAGIFEM) 10 MCG TABS vaginal tablet Place 1 tablet (10 mcg) vaginally twice a week. 24 tablet 3    Eyelid Cleansers (OCUSOFT EYELID CLEANSING) PADS Externally apply 1 Box topically 2 times daily 60 each 11    fluocinonide (LIDEX) 0.05 % external solution apply topically TO SCALP AT bedtime      lidocaine (XYLOCAINE) 5 % external ointment Apply topically as needed for moderate pain 30 g 2    sertraline (ZOLOFT) 100 MG tablet TAKE 1 TABLET (100 MG TOTAL) BY MOUTH DAILY. 90 tablet 3    tacrolimus (PROTOPIC) 0.1 % external ointment Apply pea sized drop to affected area nightly for 6 weeks then return to clinic. 60 g 1    Vitamin D-Vitamin K (VITAMIN K2-VITAMIN D3 PO) Take by mouth daily (Vit D 5000iu, Vit K 45mcg)       No current facility-administered medications for this visit.           Objective    /71   Pulse 72   Temp 97.9  F (36.6  C) (Temporal)   Resp 16   Ht 1.549 m (5' 1\")   Wt 55.8 kg (123 lb)   SpO2 95%   BMI 23.24 kg/m    Physical Exam   As noted above and including:   GEN: NAD, pleasant, good historian    Results for orders placed or performed in visit on 04/17/25   UA Macroscopic with reflex to Microscopic and Culture - Clinic Collect     Status: Normal    Specimen: Urine, Midstream   Result Value Ref Range    Color Urine Yellow " Colorless, Straw, Light Yellow, Yellow    Appearance Urine Clear Clear    Glucose Urine Negative Negative mg/dL    Bilirubin Urine Negative Negative    Ketones Urine Negative Negative mg/dL    Specific Gravity Urine 1.010 1.003 - 1.035    Blood Urine Negative Negative    pH Urine 6.5 5.0 - 7.0    Protein Albumin Urine Negative Negative mg/dL    Urobilinogen Urine 0.2 0.2, 1.0 E.U./dL    Nitrite Urine Negative Negative    Leukocyte Esterase Urine Negative Negative    Narrative    Microscopic not indicated                     The use of Dragon/[employer] dictation services may have been used to construct the content in this note; any grammatical or spelling errors are non-intentional. Please contact the author of this note directly if you are in need of any clarification.

## 2025-04-17 NOTE — PROGRESS NOTES
Urgent Care Clinic Visit    Chief Complaint   Patient presents with    Urgent Care    Urinary Problem     X3 days of blood in urine in the mornings                4/17/2025    11:01 AM   Additional Questions   Roomed by bar walton     Pre-Provider Visit Orders- Urinalysis UA/UC  Patient reports the following symptoms:  blood in the urine   Does the patient report any of the following symptoms: vaginal discharge, vaginal itching, possible yeast infection, has a urinary catheter in place, or unable to void in a specimen cup?  No    {OK to proceed with order Link to Pre-Provider Visit Standing Orders SmartSet :[id number]}

## 2025-04-22 ENCOUNTER — TELEPHONE (OUTPATIENT)
Dept: FAMILY MEDICINE | Facility: CLINIC | Age: 70
End: 2025-04-22

## 2025-04-22 ENCOUNTER — HOSPITAL ENCOUNTER (OUTPATIENT)
Dept: MRI IMAGING | Facility: CLINIC | Age: 70
Discharge: HOME | End: 2025-04-22
Attending: FAMILY MEDICINE | Admitting: FAMILY MEDICINE
Payer: COMMERCIAL

## 2025-04-22 DIAGNOSIS — M25.552 HIP PAIN, LEFT: Primary | ICD-10-CM

## 2025-04-22 DIAGNOSIS — M25.552 HIP PAIN, LEFT: ICD-10-CM

## 2025-04-22 DIAGNOSIS — M54.50 LUMBAR PAIN: ICD-10-CM

## 2025-04-22 PROCEDURE — 72158 MRI LUMBAR SPINE W/O & W/DYE: CPT

## 2025-04-22 PROCEDURE — A9585 GADOBUTROL INJECTION: HCPCS | Performed by: FAMILY MEDICINE

## 2025-04-22 PROCEDURE — 255N000002 HC RX 255 OP 636: Performed by: FAMILY MEDICINE

## 2025-04-22 RX ORDER — GADOBUTROL 604.72 MG/ML
5.5 INJECTION INTRAVENOUS ONCE
Status: COMPLETED | OUTPATIENT
Start: 2025-04-22 | End: 2025-04-22

## 2025-04-22 RX ADMIN — GADOBUTROL 5.5 ML: 604.72 INJECTION INTRAVENOUS at 12:05

## 2025-04-22 NOTE — TELEPHONE ENCOUNTER
Called pt to let her know that her insurance provider has not yet approved the MR HIP LEFT W/O & W CONTRAST that is scheduled for 4/24/2025. She will call her insurance provider for an update and will make sure it is approved before appt.    EFRAIN Marie, RN  04/22/25, 9:40 AM

## 2025-04-22 NOTE — PROGRESS NOTES
Imaging order changed for insurance coverage.     Diagnoses and all orders for this visit:    Hip pain, left  -     XR Hip CT/MR Contrast Injection Left; Future  -     MR HIP ARTHROGRAM  LEFT W CONTRAST; Future      Prashanth Estrada MD  12:33 PM, April 22, 2025

## 2025-04-23 ENCOUNTER — THERAPY VISIT (OUTPATIENT)
Dept: PHYSICAL THERAPY | Facility: CLINIC | Age: 70
End: 2025-04-23
Payer: COMMERCIAL

## 2025-04-23 ENCOUNTER — TELEPHONE (OUTPATIENT)
Dept: FAMILY MEDICINE | Facility: CLINIC | Age: 70
End: 2025-04-23

## 2025-04-23 DIAGNOSIS — M54.50 BILATERAL LOW BACK PAIN WITHOUT SCIATICA, UNSPECIFIED CHRONICITY: Primary | ICD-10-CM

## 2025-04-23 PROCEDURE — 97530 THERAPEUTIC ACTIVITIES: CPT | Mod: GP

## 2025-04-23 NOTE — TELEPHONE ENCOUNTER
Called Addie and informed her that our prior-authorization team would not be able to secure a prior-authorization prior to her appointment tomorrow on 4/24/25.  It is recommended that she reschedule for 1-2 with [facility] radiology (as that's where the PA is for) and phone number provided to her to call and reschedule.  Addie understands and thanked me for calling.  NII SeeN, RN, [facility]  RN Care Coordinator  [facility]  04/23/25  12:21 PM  Phone: [phone]

## 2025-04-30 ENCOUNTER — MYC MEDICAL ADVICE (OUTPATIENT)
Dept: FAMILY MEDICINE | Facility: CLINIC | Age: 70
End: 2025-04-30

## 2025-04-30 DIAGNOSIS — M25.552 HIP PAIN, LEFT: Primary | ICD-10-CM

## 2025-05-01 NOTE — TELEPHONE ENCOUNTER
Called [facility] radiology and was able to get Addie rescheduled for the same day 5/5/25 @ 3:20 pm for just the MRI Left Hip with and without contrast.  Left voicemail for Addie with the details.  NII SeeN, RN, [facility]  RN Care Coordinator  [facility]  05/01/25  4:59 PM  Phone: [phone]

## 2025-05-05 ENCOUNTER — HOSPITAL ENCOUNTER (OUTPATIENT)
Dept: MRI IMAGING | Facility: CLINIC | Age: 70
Discharge: HOME | End: 2025-05-05
Attending: FAMILY MEDICINE | Admitting: FAMILY MEDICINE
Payer: COMMERCIAL

## 2025-05-05 DIAGNOSIS — M25.552 HIP PAIN, LEFT: ICD-10-CM

## 2025-05-05 DIAGNOSIS — M54.50 LUMBAR PAIN: ICD-10-CM

## 2025-05-05 PROCEDURE — 255N000002 HC RX 255 OP 636: Performed by: FAMILY MEDICINE

## 2025-05-05 PROCEDURE — A9585 GADOBUTROL INJECTION: HCPCS | Performed by: FAMILY MEDICINE

## 2025-05-05 PROCEDURE — 73723 MRI JOINT LWR EXTR W/O&W/DYE: CPT | Mod: LT

## 2025-05-05 RX ORDER — GADOBUTROL 604.72 MG/ML
0.1 INJECTION INTRAVENOUS ONCE
Status: COMPLETED | OUTPATIENT
Start: 2025-05-05 | End: 2025-05-05

## 2025-05-05 RX ADMIN — GADOBUTROL 5.58 ML: 604.72 INJECTION INTRAVENOUS at 15:37

## 2025-05-07 ENCOUNTER — MYC MEDICAL ADVICE (OUTPATIENT)
Dept: FAMILY MEDICINE | Facility: CLINIC | Age: 70
End: 2025-05-07

## 2025-05-07 DIAGNOSIS — M54.50 LUMBAR PAIN: ICD-10-CM

## 2025-05-07 DIAGNOSIS — M25.552 HIP PAIN, LEFT: Primary | ICD-10-CM

## 2025-05-07 NOTE — TELEPHONE ENCOUNTER
correspondence, referrals as noted.     Diagnoses and all orders for this visit:    Hip pain, left  -     Orthopedic  Referral; Future    Lumbar pain  -     Spine  Referral; Future      Prashanth Estrada MD  5:12 PM, May 7, 2025

## 2025-05-08 ENCOUNTER — PATIENT OUTREACH (OUTPATIENT)
Dept: CARE COORDINATION | Facility: CLINIC | Age: 70
End: 2025-05-08
Payer: COMMERCIAL

## 2025-05-12 ENCOUNTER — OFFICE VISIT (OUTPATIENT)
Dept: ORTHOPEDICS | Facility: CLINIC | Age: 70
End: 2025-05-12
Attending: FAMILY MEDICINE
Payer: COMMERCIAL

## 2025-05-12 DIAGNOSIS — M47.816 LUMBAR FACET ARTHROPATHY: Primary | ICD-10-CM

## 2025-05-12 DIAGNOSIS — M25.552 HIP PAIN, LEFT: ICD-10-CM

## 2025-05-12 DIAGNOSIS — M25.559 GREATER TROCHANTERIC PAIN SYNDROME: ICD-10-CM

## 2025-05-12 DIAGNOSIS — M53.3 SACROILIAC JOINT PAIN: ICD-10-CM

## 2025-05-12 PROCEDURE — 99203 OFFICE O/P NEW LOW 30 MIN: CPT | Performed by: STUDENT IN AN ORGANIZED HEALTH CARE EDUCATION/TRAINING PROGRAM

## 2025-05-12 NOTE — LETTER
5/12/2025      Addie Carbone  [address] [place] [address]      Dear Colleague,    Thank you for referring your patient, Addie Carbone, to the [facility]. Please see a copy of my visit note below.    ASSESSMENT & PLAN    Addie was seen today for pain.    Diagnoses and all orders for this visit:    Lumbar facet arthropathy    Hip pain, left  -     Orthopedic  Referral    Sacroiliac joint pain    Greater trochanteric pain syndrome        69 year old female presents for evaluation of possible left hip pathology.  She is being followed for lower back pain and going to physical therapy.  She complains of posterior hip pain and points to her SI joint area as the primary area of her pain, with occasional radiation to the lateral hip.  On exam today, her hip exam is relatively benign, she does have some tenderness palpation over her greater trochanteric bursa, but equally equivalent bilaterally and mild hip abductor weakness bilaterally.  She does have fairly significant tenderness palpation over her SI joint as well as lumbar paraspinals.  We discussed that I do not think her symptoms are coming from intra-articular hip pathology and I recommend continued follow-up with physical therapy and to see medical spine as scheduled who can discuss possible injections.      Plan:  -Continue PT, can try adding hip abduction exercises, SI mobilization in therapy  -Follow up as scheduled with medical spine, can discuss possible injections  -Sleep with pillow in between legs for neutral spine alignment  -Apply topical Voltaren (diclofenac) gel up to every 6 hours as needed over area of pain and lateral hip  -Can try SI joint belt as needed to see if this helps  -Follow up with me as needed if pain persists after spine treatment     Dr. Berna Rao, DO, CAQ  [facility]  Sports Medicine     -----  Chief Complaint   Patient presents with  "    Left Hip - Pain       SUBJECTIVE  Addie Carbone is a/an 69 year old female who is seen in consultation at the request of  Prashanth Estrada M.D. for evaluation of left hip pain. Insidious onset that started approximately 2 months ago. Pain is located in the posterior hip, and patient states that it feels more \"in the joint\". Symptoms are worsened by prolonged laying down, leg abduction, and lateral movements. She has tried physical therapy for her low back, movement adaptations, as well as Naproxen. Associated symptoms include:  pain radiating to the anterior hip, denies numbness and tingling.    The patient is seen alone    Prior injury/Surgical history of affected joint: No  Social History/Occupation: Retired    REVIEW OF SYSTEMS:  Pertinent positives/negative: As stated above in HPI    OBJECTIVE:  There were no vitals taken for this visit.   General: Alert and in no distress  CV: Extremities appear well perfused   Resp: normal respiratory effort  MSK:  LEFT Hip Exam  Inspection:    No swelling, bruising, discoloration, or obvious deformity   Palpation:    Tenderness to palpation of greater trochanter, however equivalent tenderness on opposite side  Active Range of Motion:     Flexion  full / IR full / ER  full  Strength:    Flexion 5/5 / abduction 4/5  Special Tests:    Positive: SI provocative testing    Negative:  Logroll, anterior impingement (FADIR), Stinchfield's  She does have tenderness palpation of the posterior gluteal musculature as well as the SI joint on the left and lumbar paraspinals bilaterally    RADIOLOGY:  Final results and radiologist's interpretation available in the [facility] health record.  Images below were personally reviewed and discussed with the patient in the office today.  My personal interpretation of the performed imaging: MRI of the left hip from 5/5/2025 reveals mild degenerative changes of the hip joint with no evidence of fractures or stress reactions.  Very subtle edema of the " gluteal tendons at area of greater trochanter, no tendinopathy or tearing.        40 minutes spent by me on the date of the encounter doing chart review, history and exam, documentation and further activities per the note         Disclaimer: This note consists of text and symbols derived from dictation and/or voice recognition software. As a result, there may be errors in the script that have gone undetected. Please consider this when interpreting information found in this chart.      Again, thank you for allowing me to participate in the care of your patient.        Sincerely,        Berna Rao, DO    Electronically signed

## 2025-05-12 NOTE — PATIENT INSTRUCTIONS
1. Lumbar facet arthropathy    2. Hip pain, left    3. Sacroiliac joint pain    4. Greater trochanteric pain syndrome        Plan:  -Continue PT, can try adding hip abduction exercises, SI mobilization in therapy  -Follow up as scheduled with medical spine  -Sleep with pillow in between legs for neutral spine alignment  -Apply topical Voltaren (diclofenac) gel up to every 6 hours as needed over area of pain  -Can try SI joint belt as needed to see if this helps  -Follow up with me as needed if pain persists after spine treatment     If you had imaging performed/ordered at your appointment today, you can expect to see your radiology results in [facility] within approximately 48 business hours.     If you have questions/concerns after your appointment, please send my team a [facility] message or call the clinic at [phone].     Dr. Berna Rao, , [facility]  Sports Medicine and Orthopedics    Dr. Rao's Clinic Locations and Contact Numbers:   [place] APPOINTMENTS: [phone]      [address] [facility] RADIOLOGY: [phone]   [place] [address] PHYSICAL THERAPY: [phone]    HAND THERAPY/OT: [phone]   [place] BILLING QUESTIONS: [phone] [address] FAX: [phone]   [place] [address]

## 2025-05-12 NOTE — PROGRESS NOTES
ASSESSMENT & PLAN    Addie was seen today for pain.    Diagnoses and all orders for this visit:    Lumbar facet arthropathy    Hip pain, left  -     Orthopedic  Referral    Sacroiliac joint pain    Greater trochanteric pain syndrome        69 year old female presents for evaluation of possible left hip pathology.  She is being followed for lower back pain and going to physical therapy.  She complains of posterior hip pain and points to her SI joint area as the primary area of her pain, with occasional radiation to the lateral hip.  On exam today, her hip exam is relatively benign, she does have some tenderness palpation over her greater trochanteric bursa, but equally equivalent bilaterally and mild hip abductor weakness bilaterally.  She does have fairly significant tenderness palpation over her SI joint as well as lumbar paraspinals.  We discussed that I do not think her symptoms are coming from intra-articular hip pathology and I recommend continued follow-up with physical therapy and to see medical spine as scheduled who can discuss possible injections.      Plan:  -Continue PT, can try adding hip abduction exercises, SI mobilization in therapy  -Follow up as scheduled with medical spine, can discuss possible injections  -Sleep with pillow in between legs for neutral spine alignment  -Apply topical Voltaren (diclofenac) gel up to every 6 hours as needed over area of pain and lateral hip  -Can try SI joint belt as needed to see if this helps  -Follow up with me as needed if pain persists after spine treatment     Dr. Berna aRo, DO, CAQ  [facility]  Sports Medicine     -----  Chief Complaint   Patient presents with    Left Hip - Pain       SUBJECTIVE  Addie Carbone is a/an 69 year old female who is seen in consultation at the request of  Prashanth Estrada M.D. for evaluation of left hip pain. Insidious onset that started approximately 2 months ago. Pain is located in the  "posterior hip, and patient states that it feels more \"in the joint\". Symptoms are worsened by prolonged laying down, leg abduction, and lateral movements. She has tried physical therapy for her low back, movement adaptations, as well as Naproxen. Associated symptoms include:  pain radiating to the anterior hip, denies numbness and tingling.    The patient is seen alone    Prior injury/Surgical history of affected joint: No  Social History/Occupation: Retired    REVIEW OF SYSTEMS:  Pertinent positives/negative: As stated above in HPI    OBJECTIVE:  There were no vitals taken for this visit.   General: Alert and in no distress  CV: Extremities appear well perfused   Resp: normal respiratory effort  MSK:  LEFT Hip Exam  Inspection:    No swelling, bruising, discoloration, or obvious deformity   Palpation:    Tenderness to palpation of greater trochanter, however equivalent tenderness on opposite side  Active Range of Motion:     Flexion  full / IR full / ER  full  Strength:    Flexion 5/5 / abduction 4/5  Special Tests:    Positive: SI provocative testing    Negative:  Logroll, anterior impingement (FADIR), Stinchfield's  She does have tenderness palpation of the posterior gluteal musculature as well as the SI joint on the left and lumbar paraspinals bilaterally    RADIOLOGY:  Final results and radiologist's interpretation available in the [facility] health record.  Images below were personally reviewed and discussed with the patient in the office today.  My personal interpretation of the performed imaging: MRI of the left hip from 5/5/2025 reveals mild degenerative changes of the hip joint with no evidence of fractures or stress reactions.  Very subtle edema of the gluteal tendons at area of greater trochanter, no tendinopathy or tearing.        40 minutes spent by me on the date of the encounter doing chart review, history and exam, documentation and further activities per the note         Disclaimer: This note consists of " text and symbols derived from dictation and/or voice recognition software. As a result, there may be errors in the script that have gone undetected. Please consider this when interpreting information found in this chart.

## 2025-05-14 ENCOUNTER — THERAPY VISIT (OUTPATIENT)
Dept: PHYSICAL THERAPY | Facility: CLINIC | Age: 70
End: 2025-05-14
Payer: COMMERCIAL

## 2025-05-14 DIAGNOSIS — M54.50 BILATERAL LOW BACK PAIN WITHOUT SCIATICA, UNSPECIFIED CHRONICITY: Primary | ICD-10-CM

## 2025-05-14 PROCEDURE — 97110 THERAPEUTIC EXERCISES: CPT | Mod: GP

## 2025-05-16 NOTE — PROGRESS NOTES
[facility]                                                                                   OUTPATIENT PHYSICAL THERAPY    PLAN OF TREATMENT FOR OUTPATIENT REHABILITATION   Patient's Last Name, First Name, Addie Nunez YOB: 1955   Provider's Name   [facility]   Medical Record No.  [id number]     Onset Date: 12/05/24 (date of order)  Start of Care Date: 12/11/24     Medical Diagnosis:  Bilateral low back pain without sciatica, unspecified chronicity.      PT Treatment Diagnosis:  Bilateral low back pain without sciatica, unspecified chronicity. Plan of Treatment  Frequency/Duration: 2x per month/ 12 weeks    Certification date from 5/28/25 to 8/20/25        See note for plan of treatment details and functional goals     Priyanka Goodrich PT                         I CERTIFY THE NEED FOR THESE SERVICES FURNISHED UNDER        THIS PLAN OF TREATMENT AND WHILE UNDER MY CARE     (Physician attestation of this document indicates review and certification of the therapy plan).              Referring Provider:  Prashanth Estrada    Initial Assessment  See Epic Evaluation- Start of Care Date: 12/11/24            PLAN  Continue therapy per current plan of care with reduced frequency. Patient is improving slowly from her flare in symptoms allowing us to progress her HEP this date. Recommend continued therapy for further progress strength and stabilization to reduce back pain.     Beginning/End Dates of Progress Note Reporting Period:  03/20/25 to 05/14/2025    Referring Provider:  Prashanth Estrada         05/14/25 0500   Appointment Info   Signing clinician's name / credentials Priyanka Goodrich PT DPT   Total/Authorized Visits Evaluate and treat   Visits Used 8   Medical Diagnosis Bilateral low back pain without sciatica, unspecified chronicity.   PT Tx Diagnosis Bilateral low back pain without sciatica, unspecified chronicity.   Progress  Note/Certification   Start of Care Date 12/11/24   Onset of illness/injury or Date of Surgery 12/05/24  (date of order)   Therapy Frequency 2x per month   Predicted Duration 12 weeks   Certification date from 03/05/25   Certification date to 05/27/25   Progress Note Due Date 05/27/25   Progress Note Completed Date 03/20/25   [facility] Authorization Information   Condition type Chronic (continuous duration <3 months)   Cause of current episode Unspecified   Nature of treatment Rehabilitative   Functional ability Moderate functional limitations   Documented POC (choose all that apply) Measurable short and long term/discharge treatment goals related to physical and functional deficits.;Frequency of treatment visits and treatment activities to address deficit areas.;Patient agrees to program participation including home program   Briefly describe symptoms Low back pain, left hip pain. 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 Making improvement in other areas of back symptoms.   Target Date 08/20/25   PT Goal 2   Goal Identifier LBP   Goal Description Patient will report <2/10 average low back pain for at least 2 weeks with desired activities to show improved symptoms    Rationale to maximize safety and independence with performance of ADLs and functional tasks   Goal Progress 4/10   Target Date 08/20/25   Subjective Report   Subjective Report Addie reports having imaging on her lower spine and hips, an evaluation for hips with an orthopedic specialist and an upcoming ortho spine appointment. Patient reports slightly improved hip symptoms with continued pain in L hip and low back. Reports near constant low back pain with moderate relief with exercises. Flexion continues to relief symptoms when they occur.   Objective Measures   Objective Measures Objective Measure 1;Objective Measure 2   Objective Measure 2   Objective Measure Lumbar ROM   Details Extension: min limited, mild pain. Flexion WNL   Treatment Interventions (PT)   Interventions Therapeutic Procedure/Exercise;Therapeutic Activity;Neuromuscular Re-education;Manual Therapy   Therapeutic Procedure/Exercise   Therapeutic Procedures: strength, endurance, ROM, flexibility minutes (22837) 26   Therapeutic Procedures Ther Proc 2   PTRx Ther Proc 1 Flexion in Sitting with Patient Overpressure/Progression to Knee Extension   PTRx Ther Proc 1 - Details 2x10 sec   PTRx Ther Proc 2 All 4s leg lift   PTRx Ther Proc 2 - Details 2x4   PTRx Ther Proc 3 Wall Sit   PTRx Ther Proc 3 - Details 3x25 sec holds   PTRx Ther Proc 4 Pallof press   PTRx Ther Proc 4 - Details 2x10 bilaterally Blue TB   PTRx Ther Proc 5 Reverse Clamshell   PTRx Ther Proc 5 - Details x12 bilaterally   PTRx Ther Proc 6 Hooklying Hip Abduction   PTRx Ther Proc 6 - Details x20 RTB hooklying   PTRx Ther Proc 7 Pretzel Stretch   PTRx Ther Proc 7 - Details 30-60 second hold bilaterally   PTRx Ther Proc 8 Shoulder Theraband Rows   PTRx Ther Proc 8 - Details HEP   Skilled Intervention TC/VC for form and posture, modifications and dosage as indicated for safety and symptom mitigation   Patient Response/Progress Tolerated well, modified HEP and progressed as tolerated    Therapeutic Activity   Therapeutic Activities: dynamic activities to improve functional performance minutes (48603) 5   Therapeutic Activities Ther Act 2   Ther Act 2 Patient education   Ther Act 2 - Details Reviewed orthopedic suggestions for hip and her physician's impression of images to ensure patient that her hips may be sore but structurally safe for exercises. May continue to increase activities as tolerated.   Skilled Intervention Education in body mechanics and pain mgmt strategies to reduce pain and improve function. Time taken to answer patient's questions.   Patient Response/Progress Verbalized understanding   Manual Therapy   Manual Therapy Manual Therapy 2   Education   Learner/Method Patient   Plan   Home program PTRx   Updates to plan of care Progressed hip roll outs to RTB, pallof press, all 4s leg lift   Plan for next session Progress pelvis and low back stabilization exercises (superman, dead bug, paloff press), progress hip stabilization/strengthening. PFM assessment when constented.   Comments   Comments Consider pelvic floor assessment in future based on Gyn's reports of R PFM tension   Total Session Time   Timed Code Treatment Minutes 31   Total Treatment Time (sum of timed and untimed services) 31

## 2025-06-03 ENCOUNTER — TELEPHONE (OUTPATIENT)
Dept: PHYSICAL MEDICINE AND REHAB | Facility: CLINIC | Age: 70
End: 2025-06-03

## 2025-06-03 ENCOUNTER — OFFICE VISIT (OUTPATIENT)
Dept: PHYSICAL MEDICINE AND REHAB | Facility: CLINIC | Age: 70
End: 2025-06-03
Attending: FAMILY MEDICINE
Payer: COMMERCIAL

## 2025-06-03 VITALS
DIASTOLIC BLOOD PRESSURE: 55 MMHG | HEART RATE: 88 BPM | WEIGHT: 125 LBS | BODY MASS INDEX: 23.6 KG/M2 | HEIGHT: 61 IN | OXYGEN SATURATION: 98 % | SYSTOLIC BLOOD PRESSURE: 102 MMHG

## 2025-06-03 DIAGNOSIS — M53.3 SACROILIAC JOINT DYSFUNCTION OF RIGHT SIDE: Primary | ICD-10-CM

## 2025-06-03 DIAGNOSIS — M54.50 LUMBAR PAIN: ICD-10-CM

## 2025-06-03 PROCEDURE — 3074F SYST BP LT 130 MM HG: CPT | Performed by: STUDENT IN AN ORGANIZED HEALTH CARE EDUCATION/TRAINING PROGRAM

## 2025-06-03 PROCEDURE — 3078F DIAST BP <80 MM HG: CPT | Performed by: STUDENT IN AN ORGANIZED HEALTH CARE EDUCATION/TRAINING PROGRAM

## 2025-06-03 PROCEDURE — 99204 OFFICE O/P NEW MOD 45 MIN: CPT | Performed by: STUDENT IN AN ORGANIZED HEALTH CARE EDUCATION/TRAINING PROGRAM

## 2025-06-03 PROCEDURE — 1125F AMNT PAIN NOTED PAIN PRSNT: CPT | Performed by: STUDENT IN AN ORGANIZED HEALTH CARE EDUCATION/TRAINING PROGRAM

## 2025-06-03 ASSESSMENT — PAIN SCALES - GENERAL: PAINLEVEL_OUTOF10: MILD PAIN (2)

## 2025-06-03 NOTE — TELEPHONE ENCOUNTER
Procedure ordered? yes    What insurance are we billing for this procedure?  [employer] Medicare ADV  IF SCHEDULING AT [facility] PLEASE SCHEDULE AT LEAST 7-10 BUSINESS DAYS OUT SO A PA CAN BE OBTAINED    Is a  PAIN  order available to link to injection appointment or does one need to be transcribed?  YES: Right SI joint injection    If needed, route to CN to assist in doing so.    Is  needed?: No   If YES, please initiate in-person  request.    Will patient have a ?  Yes    All fluoro procedures require a .  These US procedures also require a : piriformis, pudendal, scalene, & carpal tunnel.    Is patient taking any blood thinners (i.e. Plavix, coumadin, jantoven, warfarin, heparin, Xarelto, Pradaxa, Eliquis, Brilinta, or Effient, etc)? No   If YES, schedule out 2 weeks, and route to RN pool to determine if medication hold is needed.    Is patient taking aspirin? No   For CERVICAL or INTERLAMINAR procedures, route message to Care Navigation so they can seek approval from managing provider to hold aspirin for 6 days prior to the procedure.    Does patient have an allergy to contrast dye or iodine?  No  If YES, OK to schedule. Route to RN pool AND add allergy information to appointment notes    Is patient diabetic? No If YES, blood glucose level will be checked on day of procedure and will need to be 300mg/dL or below (for steroid injections).    Does patient have an active infection or treated for one within the past week? Yes - Yeast   If YES, do NOT schedule and route to Care Navigation.     Is patient currently taking any antibiotics or have an active infection?  Yes  For patients on chronic, preventative, or prophylactic antibiotics, procedures may be scheduled.   For patients on antibiotics for active or recent infection: antibiotic course must have been completed and symptom-free of infection to safely proceed with injection.  Send to Care Navigation if  unsure.    Is patient actively being treated for cancer or immunocompromised? No  If YES, do NOT schedule and route to RN pool.     Any chance of pregnancy? NO   If YES, do NOT schedule and route to RN pool.    Have you had any vaccines in the last 2 weeks? NO  If YES, please route to Care Navigation to discuss before scheduling.     Reminders:  -  If you are started on any steroids or antibiotics between now and your appointment, you must contact us because it may affect our ability to perform your procedure.   reviewed    -  Informed patient that it is OK to take normal medications before the procedure and must hold blood thinners as instructed.  reviewed    -  Patients scheduled for MBBs should not take pain meds prior to injection and pain rating needs to be 5/10 or greater the day of the procedure.    -  Informed cervical injection patients that an IV will be placed as a precautionary measure.  reviewed    -  Patients should eat a light meal prior to their procedure appointment.  reviewed    -  All radiofrequency ablations are in a 40 minute time slot and not to be scheduled until in-basket message has been sent by the procedure team that the PA has been  received.  reviewed     -  Spinal cord stimulator trial scheduling is coordinated by the procedure team.    -  Care Navigation (#[phone]) is available to assist patients with additional questions.  reviewed    -  Cervical TFESIs with Dr. Hudson only.    *PLEASE FORWARD TO CARE NAVIGATION IF INDICATED.  PATIENT SHOULD BE INFORMED THAT THEY WILL BE CONTACTED BY CARE NAVIGATION ONLY IF CHANGES TO MEDICATION/CARE PLAN RECOMMENDATIONS ARE NEEDED.  IF THEY DO NOT HEAR BACK FROM CARE NAVIGATION, THEY ARE FINE TO CONTINUE WITH THEIR CURRENT MEDICATIONS/CARE PLAN.*

## 2025-06-03 NOTE — LETTER
6/3/2025      Addie Carbone  [address] [place] [address]      Dear Colleague,    Thank you for referring your patient, Addie Carbone, to the [facility]. Please see a copy of my visit note below.    ASSESSMENT:  Addie Carbone is a 69 year old female presents for consultation at the request of Prashanth Estrada who presents today for new patient evaluation of :         Diagnoses and all orders for this visit:  Sacroiliac joint dysfunction of right side  -     PAIN Joint Injection Sacroiliac Joint Right; Future  Lumbar pain  -     Spine  Referral       Patient is neurologically intact on exam. No myelopathic or red flag symptoms.    Patient is a 69-year-old female presenting for evaluation of chronic right-sided low back pain over the right SI joint.  Her exam is positive for all provocative maneuvers of the right SI joint and negative for any other focal areas of involvement in the lumbar spine.  We did review her MRI lumbar spine which does show a lumbar disc protrusion at L4-L5 but as she is not having any radiating pain and no significant pain with flexion, a discogenic source seems less likely.  As patient has been trying conservative management for the last 9 months with physical therapy and anti-inflammatories, we discussed proceeding to interventional treatment which she is very excited to try.  Order placed, patient may schedule when available.    PLAN:  Reviewed spine anatomy and disease process. Discussed diagnosis and treatment options with the patient today. A shared decision making model was used. The patient's values and choices were respected. The following represents what was discussed and decided upon by the provider and the patient.    1. DIAGNOSTIC TESTS  No new imaging orders at this time.    2. PHYSICAL THERAPY  Patient is already in PT or has a pending referral to begin soon.  Discussed the importance of core strengthening, ROM, stretching exercises  with the patient and how each of these entities is important in decreasing pain.  Explained to the patient that the purpose of physical therapy is to teach the patient a home exercise program.  These exercises need to be performed every day in order to decrease pain and prevent future occurrences of pain.  Likened it to brushing one's teeth.      3. MEDICATIONS:  Discussed multiple medication options today with patient. Discussed risks, side effects, and proper use of medications. Patient verbalized understanding.  No new medications ordered at this visit.    4. INTERVENTIONS:  Right Sacroiliac Joint Steroid Injection.    5. OTHER REFERRALS:  No other referrals at this time.    6. FOLLOW-UP  In approximately 2-4 weeks to assess response to injection.  Patient advised to contact our office for earlier appointment if symptoms worsening or not improving, or any side effects are noticed.    Advised patient to call the Spine Center if symptoms worsen or you have problems controlling bladder and bowel function.   ______________________________________________________________________    SUBJECTIVE:   Addie Carbone  is a 69 year old female who presents today for new patient evaluation.    Patient presents for evaluation of chronic right-sided low back pain which is located just lateral to the sacrum and below the iliac crest on the right side.  Pain does not radiate anywhere and stays focally in that 1 spot.  Symptoms worsen with standing on hard surfaces or any kind of lifting.  They improved with the use of Aleve and doing her home exercise stretches from PT.  Since onset of symptoms about 1 year ago, patient has been doing physical therapy for almost 9 months and has not seen any significant improvement in symptoms.  No radiating symptoms down the legs, no numbness or weakness, no bladder or bowel incontinence.    No prior back surgeries    -Treatment to Date: As above      Oswestry (SANDOVAL) Questionnaire        5/31/2025     12:08 PM   OSWESTRY DISABILITY INDEX   Count 10    Sum 15    Oswestry Score (%) 30 %        Patient-reported       Neck Disability Index:      5/31/2025    12:11 PM   Neck Disability Index (  Alex GALLEGOS. and [employer]. 1991. All rights reserved.; used with permission)   SECTION 1 - PAIN INTENSITY 0   SECTION 2 - PERSONAL CARE 0   SECTION 3 - LIFTING 4   SECTION 4 - READING 1   SECTION 5 - HEADACHES 0   SECTION 6 - CONCENTRATION 0   SECTION 7 - WORK 2   SECTION 8 - DRIVING 0   SECTION 9 - SLEEPING 0   SECTION 10 - RECREATION 1   Count 10    Sum 8    Raw Score: /50 8    Neck Disability Index Score: (%) 16 %        Patient-reported              -Medications:    Current Outpatient Medications   Medication Sig Dispense Refill     alendronate (FOSAMAX) 70 MG tablet Take 1 tablet (70 mg) by mouth every 7 days. 12 tablet 3     calcium carbonate (OS-NILE) 1500 (600 Ca) MG tablet Take 600 mg by mouth daily       clobetasol (TEMOVATE) 0.05 % external ointment Apply topically twice a week. 45 g 2     estradiol (VAGIFEM) 10 MCG TABS vaginal tablet Place 1 tablet (10 mcg) vaginally twice a week. 24 tablet 3     Eyelid Cleansers (OCUSOFT EYELID CLEANSING) PADS Externally apply 1 Box topically 2 times daily 60 each 11     fluocinonide (LIDEX) 0.05 % external solution apply topically TO SCALP AT bedtime       lidocaine (XYLOCAINE) 5 % external ointment Apply topically as needed for moderate pain 30 g 2     sertraline (ZOLOFT) 100 MG tablet TAKE 1 TABLET (100 MG TOTAL) BY MOUTH DAILY. 90 tablet 3     tacrolimus (PROTOPIC) 0.1 % external ointment Apply pea sized drop to affected area nightly for 6 weeks then return to clinic. 60 g 1     Vitamin D-Vitamin K (VITAMIN K2-VITAMIN D3 PO) Take by mouth daily (Vit D 5000iu, Vit K 45mcg)       No current facility-administered medications for this visit.       Allergies   Allergen Reactions     Other Allergy (See Comments) [External Allergen Needs Reconciliation - See Comment] Hives      "Childhood reaction     Sulfa (Sulfonamide Antibiotics) [Sulfa Antibiotics] Unknown       No past medical history on file.     Patient Active Problem List   Diagnosis     Basal Cell Carcinoma Of The Skin     Vitamin D Deficiency     Dysthymic Disorder     Mammogram - Abnormal     Sarcoidosis     Hemorrhoids     Lesion of vulva     Screening for colon cancer     Pachychoroid pigment epitheliopathy     Bilateral low back pain without sciatica, unspecified chronicity       Past Surgical History:   Procedure Laterality Date     ZZC  DELIVERY ONLY      Description:  Section;  Recorded: 2010;  Comments: x2     ZZC LIGATE FALLOPIAN TUBE      Description: Tubal Ligation;  Recorded: 2010;       Family History   Problem Relation Age of Onset     Chronic Obstructive Pulmonary Disease Mother      Heart Disease Mother      Glaucoma Father      Esophageal Cancer Father      Heart Disease Father      Hyperlipidemia Brother      Hypertension Brother      Heart Disease Brother      Thyroid nodules Daughter      Depression Daughter      Macular Degeneration No family hx of        Reviewed past medical, surgical, and family history with patient found on new patient intake packet located in EMR Media tab.     SOCIAL HX: Reviewed    ROS: Specifically negative for bowel/bladder dysfunction, balance changes, headache, dizziness, foot drop, fevers, chills, appetite changes, nausea/vomiting, unexplained weight loss. Otherwise 13 systems reviewed are negative. Please see the patient's intake questionnaire from today for details.    OBJECTIVE:  /55 (BP Location: Left arm, Patient Position: Sitting, Cuff Size: Adult Regular)   Pulse 88   Ht 5' 1\" (1.549 m)   Wt 125 lb (56.7 kg)   SpO2 98%   BMI 23.62 kg/m      PHYSICAL EXAMINATION:  --CONSTITUTIONAL: Vital signs as above. No acute distress. The patient is well nourished and well groomed.  --PSYCHIATRIC: The patient is awake, alert, oriented to person, " place, time and answering questions appropriately with clear speech. Appropriate mood and affect   --RESPIRATORY: Normal rhythm and effort. No abnormal accessory muscle breathing patterns noted.   --GROSS MOTOR: Easily arises from a seated position.  --LUMBAR SPINE: Inspection reveals no evidence of deformity. Range of motion is not limited in flexion, extension, lateral rotation. Mild tenderness to palpation of lumbar paraspinals, no tenderness in spinous processes.  Facet loading (Blanc test) negative, seated SLR negative  --HIPS: Full range of motion bilaterally.  Positive Mouna, Rober, anterior compression, lateral compression, Gaenslen, and thigh thrust tests in the right SI joint.  --LOWER EXTREMITY MOTOR TESTING:  Hip flexion left 5/5, right 5/5  Knee extension left 5/5, right 5/5  Ankle dorsiflexors left 5/5, right 5/5  Ankle plantarflexors left 5/5, right 5/5   Great toe extension left 5/5, right 5/5   Knee flexion left 5/5, right 5/5  --NEUROLOGIC: Sensation to lower extremities is intact bilaterally in L2-S1 dermatomes.  Negative Banegas's bilaterally. Reflexes intact in BLE, no clonus.  --VASCULAR: Warm upper limbs bilaterally. Capillary refill in the upper extremities is less than 1 second.    RESULTS: Available medical records from [facility] and any other outside records were reviewed today.     Imaging:  Available relevant imaging was personally reviewed and interpreted today. The images were shown to the patient and the findings were explained using a spine model.    MR Hip Left w/o & w Contrast  Result Date: 5/6/2025  EXAM: MR HIP LEFT W/O and W CONTRAST LOCATION: [facility] DATE: 5/5/2025 INDICATION: Hip pain COMPARISON: Radiographs 10/09/2017 TECHNIQUE: Routine. Additional postgadolinium T1 sequences were obtained. IV CONTRAST: 5.58 mL gadavist FINDINGS: LEFT HIP: -Labrum: Circumferential degenerative blunting of the acetabular labrum with degenerative posterior  and superior labral calcification. -Cartilage: Diffuse thinning of the articular cartilage throughout the hip joint with areas of full-thickness/near full-thickness chondral thinning at the medial and superolateral femoral head. No subchondral cystic change. -Joint space: No effusion or synovitis. -Joint capsule/ligaments: Intact joint capsule. Ligamentum teres is intact. MUSCLES AND TENDONS: -Gluteal: No tendon tear or tendinopathy. No trochanteric bursitis. -Proximal hamstring: No tendon tear or tendinopathy. -Iliopsoas: No tendon tear or tendinopathy. No bursitis. -Rectus femoris origin: No tear or tendinopathy. BONES: -No fracture or concerning marrow replacing lesion. Tiny degenerative marginal osteophyte femoral head neck junction. SOFT TISSUES: -Normal muscle bulk. No acute muscular injury. INTRA-PELVIC CONTENTS: -Sigmoid diverticulosis.     IMPRESSION: 1.  Mild degenerative arthritis left hip with diffuse thinning of the articular cartilage and areas of full-thickness/near full-thickness chondral thinning at the medial and superolateral femoral head. 2.  Circumferential degenerative blunting of the acetabular labrum with degenerative posterior and superior labral calcification. 3.  No acute fracture or stress reaction.      4/22/25 MRI lumbar spine:    IMPRESSION:  1.  Multilevel disc degeneration and facet hypertrophy as described above.  2.  Marrow edema involving the L5 pedicles and L4-L5 facets with adjacent paraspinal soft tissue edema and enhancement. This is favored to represent reactive/degenerative edema. Infection is felt to be less likely but not entirely excluded.    Again, thank you for allowing me to participate in the care of your patient.        Sincerely,        Don Jean Baptiste MD    Electronically signed

## 2025-06-03 NOTE — PATIENT INSTRUCTIONS
~Spine Center Scheduling #[phone].  ~Please call our [facility] Spine Nurse Navigation #[phone] with any questions or concerns about your treatment plan, if symptoms worsen and you would like to be seen urgently, or if you have problems controlling bladder and bowel function.  ~For any future flareups or new symptoms, recommend follow-up in clinic or contact the nurse navigator line.  ~Please note that any My Chart messages may take multiple days for a response due to the high volume of patients seen in clinic.  Anything sent Friday night or after will be answered the following week when able.    An injection has been ordered today to potentially help with your pain symptoms. These injections do not fix what is going on in your back, therefore they typically do not take away the pain completely, however they can many times help improve symptoms. Injections should always be completed along with other modalities such as physical therapy for the best long term outcomes. If injections alone are done, then pain will likely return.     [facility] Injection Requirements    A  is required for all fluoroscopically-guided injections.  Injection appointments may be cancelled if there are signs/symptoms of an active infection or if the patient is being actively treated with antibiotics for a diagnosed infection.  Patients may have their steroid injection cancelled if they have had another steroid injection within 2 weeks.  Diabetic patients will have their blood glucose levels checked the day of their injection and the appointment will be rescheduled if the blood glucose level is 300 or higher.  Patients with allergies to cortisone, local anesthetics, iodine, or contrast dye should contact the Spine Center to further discuss these considerations.  Patients scheduled for medial branch block diagnostic injections should refrain from taking pain medication the day of the  procedure.  The medial branch block injection appointment will be rescheduled if the patient's pain rating is not 5/10 or greater at the time of the procedure.  Patients taking warfarin/Coumadin will have their INR checked the day of the procedure and the procedure may be rescheduled if the INR is greater than 3.0.  Please contact the Spine Center (#[phone]) if you are taking any prescription blood-thinning medications (warfarin, Plavix, Lovenox, Eliquis, Brilinta, Effient, etc.) as special dosing adjustments may need to be made depending on the type of injection you are scheduled to receive.  It is recommended that you delay having your steroid injection if you have received a flu shot or shingles vaccine within 2 weeks.

## 2025-06-03 NOTE — PROGRESS NOTES
ASSESSMENT:  Addie Carbone is a 69 year old female presents for consultation at the request of Prashanth Estrada who presents today for new patient evaluation of :         Diagnoses and all orders for this visit:  Sacroiliac joint dysfunction of right side  -     PAIN Joint Injection Sacroiliac Joint Right; Future  Lumbar pain  -     Spine  Referral       Patient is neurologically intact on exam. No myelopathic or red flag symptoms.    Patient is a 69-year-old female presenting for evaluation of chronic right-sided low back pain over the right SI joint.  Her exam is positive for all provocative maneuvers of the right SI joint and negative for any other focal areas of involvement in the lumbar spine.  We did review her MRI lumbar spine which does show a lumbar disc protrusion at L4-L5 but as she is not having any radiating pain and no significant pain with flexion, a discogenic source seems less likely.  As patient has been trying conservative management for the last 9 months with physical therapy and anti-inflammatories, we discussed proceeding to interventional treatment which she is very excited to try.  Order placed, patient may schedule when available.    PLAN:  Reviewed spine anatomy and disease process. Discussed diagnosis and treatment options with the patient today. A shared decision making model was used. The patient's values and choices were respected. The following represents what was discussed and decided upon by the provider and the patient.    1. DIAGNOSTIC TESTS  No new imaging orders at this time.    2. PHYSICAL THERAPY  Patient is already in PT or has a pending referral to begin soon.  Discussed the importance of core strengthening, ROM, stretching exercises with the patient and how each of these entities is important in decreasing pain.  Explained to the patient that the purpose of physical therapy is to teach the patient a home exercise program.  These exercises need to be performed every  day in order to decrease pain and prevent future occurrences of pain.  Likened it to brushing one's teeth.      3. MEDICATIONS:  Discussed multiple medication options today with patient. Discussed risks, side effects, and proper use of medications. Patient verbalized understanding.  No new medications ordered at this visit.    4. INTERVENTIONS:  Right Sacroiliac Joint Steroid Injection.    5. OTHER REFERRALS:  No other referrals at this time.    6. FOLLOW-UP  In approximately 2-4 weeks to assess response to injection.  Patient advised to contact our office for earlier appointment if symptoms worsening or not improving, or any side effects are noticed.    Advised patient to call the Spine Center if symptoms worsen or you have problems controlling bladder and bowel function.   ______________________________________________________________________    SUBJECTIVE:   Addie Carbone  is a 69 year old female who presents today for new patient evaluation.    Patient presents for evaluation of chronic right-sided low back pain which is located just lateral to the sacrum and below the iliac crest on the right side.  Pain does not radiate anywhere and stays focally in that 1 spot.  Symptoms worsen with standing on hard surfaces or any kind of lifting.  They improved with the use of Aleve and doing her home exercise stretches from PT.  Since onset of symptoms about 1 year ago, patient has been doing physical therapy for almost 9 months and has not seen any significant improvement in symptoms.  No radiating symptoms down the legs, no numbness or weakness, no bladder or bowel incontinence.    No prior back surgeries    -Treatment to Date: As above      Oswestry (SANDOVAL) Questionnaire        5/31/2025    12:08 PM   OSWESTRY DISABILITY INDEX   Count 10    Sum 15    Oswestry Score (%) 30 %        Patient-reported       Neck Disability Index:      5/31/2025    12:11 PM   Neck Disability Index (  Alex GALLEGOS. and [employer]. 1991. All rights  reserved.; used with permission)   SECTION 1 - PAIN INTENSITY 0   SECTION 2 - PERSONAL CARE 0   SECTION 3 - LIFTING 4   SECTION 4 - READING 1   SECTION 5 - HEADACHES 0   SECTION 6 - CONCENTRATION 0   SECTION 7 - WORK 2   SECTION 8 - DRIVING 0   SECTION 9 - SLEEPING 0   SECTION 10 - RECREATION 1   Count 10    Sum 8    Raw Score: /50 8    Neck Disability Index Score: (%) 16 %        Patient-reported              -Medications:    Current Outpatient Medications   Medication Sig Dispense Refill    alendronate (FOSAMAX) 70 MG tablet Take 1 tablet (70 mg) by mouth every 7 days. 12 tablet 3    calcium carbonate (OS-NILE) 1500 (600 Ca) MG tablet Take 600 mg by mouth daily      clobetasol (TEMOVATE) 0.05 % external ointment Apply topically twice a week. 45 g 2    estradiol (VAGIFEM) 10 MCG TABS vaginal tablet Place 1 tablet (10 mcg) vaginally twice a week. 24 tablet 3    Eyelid Cleansers (OCUSOFT EYELID CLEANSING) PADS Externally apply 1 Box topically 2 times daily 60 each 11    fluocinonide (LIDEX) 0.05 % external solution apply topically TO SCALP AT bedtime      lidocaine (XYLOCAINE) 5 % external ointment Apply topically as needed for moderate pain 30 g 2    sertraline (ZOLOFT) 100 MG tablet TAKE 1 TABLET (100 MG TOTAL) BY MOUTH DAILY. 90 tablet 3    tacrolimus (PROTOPIC) 0.1 % external ointment Apply pea sized drop to affected area nightly for 6 weeks then return to clinic. 60 g 1    Vitamin D-Vitamin K (VITAMIN K2-VITAMIN D3 PO) Take by mouth daily (Vit D 5000iu, Vit K 45mcg)       No current facility-administered medications for this visit.       Allergies   Allergen Reactions    Other Allergy (See Comments) [External Allergen Needs Reconciliation - See Comment] Hives     Childhood reaction    Sulfa (Sulfonamide Antibiotics) [Sulfa Antibiotics] Unknown       No past medical history on file.     Patient Active Problem List   Diagnosis    Basal Cell Carcinoma Of The Skin    Vitamin D Deficiency    Dysthymic Disorder     "Mammogram - Abnormal    Sarcoidosis    Hemorrhoids    Lesion of vulva    Screening for colon cancer    Pachychoroid pigment epitheliopathy    Bilateral low back pain without sciatica, unspecified chronicity       Past Surgical History:   Procedure Laterality Date    ZZC  DELIVERY ONLY      Description:  Section;  Recorded: 2010;  Comments: x2    ZZC LIGATE FALLOPIAN TUBE      Description: Tubal Ligation;  Recorded: 2010;       Family History   Problem Relation Age of Onset    Chronic Obstructive Pulmonary Disease Mother     Heart Disease Mother     Glaucoma Father     Esophageal Cancer Father     Heart Disease Father     Hyperlipidemia Brother     Hypertension Brother     Heart Disease Brother     Thyroid nodules Daughter     Depression Daughter     Macular Degeneration No family hx of        Reviewed past medical, surgical, and family history with patient found on new patient intake packet located in EMR Media tab.     SOCIAL HX: Reviewed    ROS: Specifically negative for bowel/bladder dysfunction, balance changes, headache, dizziness, foot drop, fevers, chills, appetite changes, nausea/vomiting, unexplained weight loss. Otherwise 13 systems reviewed are negative. Please see the patient's intake questionnaire from today for details.    OBJECTIVE:  /55 (BP Location: Left arm, Patient Position: Sitting, Cuff Size: Adult Regular)   Pulse 88   Ht 5' 1\" (1.549 m)   Wt 125 lb (56.7 kg)   SpO2 98%   BMI 23.62 kg/m      PHYSICAL EXAMINATION:  --CONSTITUTIONAL: Vital signs as above. No acute distress. The patient is well nourished and well groomed.  --PSYCHIATRIC: The patient is awake, alert, oriented to person, place, time and answering questions appropriately with clear speech. Appropriate mood and affect   --RESPIRATORY: Normal rhythm and effort. No abnormal accessory muscle breathing patterns noted.   --GROSS MOTOR: Easily arises from a seated position.  --LUMBAR SPINE: Inspection " reveals no evidence of deformity. Range of motion is not limited in flexion, extension, lateral rotation. Mild tenderness to palpation of lumbar paraspinals, no tenderness in spinous processes.  Facet loading (Blanc test) negative, seated SLR negative  --HIPS: Full range of motion bilaterally.  Positive Mouna, Rober, anterior compression, lateral compression, Gaenslen, and thigh thrust tests in the right SI joint.  --LOWER EXTREMITY MOTOR TESTING:  Hip flexion left 5/5, right 5/5  Knee extension left 5/5, right 5/5  Ankle dorsiflexors left 5/5, right 5/5  Ankle plantarflexors left 5/5, right 5/5   Great toe extension left 5/5, right 5/5   Knee flexion left 5/5, right 5/5  --NEUROLOGIC: Sensation to lower extremities is intact bilaterally in L2-S1 dermatomes.  Negative Banegas's bilaterally. Reflexes intact in BLE, no clonus.  --VASCULAR: Warm upper limbs bilaterally. Capillary refill in the upper extremities is less than 1 second.    RESULTS: Available medical records from [facility] and any other outside records were reviewed today.     Imaging:  Available relevant imaging was personally reviewed and interpreted today. The images were shown to the patient and the findings were explained using a spine model.    MR Hip Left w/o & w Contrast  Result Date: 5/6/2025  EXAM: MR HIP LEFT W/O and W CONTRAST LOCATION: [facility] DATE: 5/5/2025 INDICATION: Hip pain COMPARISON: Radiographs 10/09/2017 TECHNIQUE: Routine. Additional postgadolinium T1 sequences were obtained. IV CONTRAST: 5.58 mL gadavist FINDINGS: LEFT HIP: -Labrum: Circumferential degenerative blunting of the acetabular labrum with degenerative posterior and superior labral calcification. -Cartilage: Diffuse thinning of the articular cartilage throughout the hip joint with areas of full-thickness/near full-thickness chondral thinning at the medial and superolateral femoral head. No subchondral cystic change. -Joint space: No  effusion or synovitis. -Joint capsule/ligaments: Intact joint capsule. Ligamentum teres is intact. MUSCLES AND TENDONS: -Gluteal: No tendon tear or tendinopathy. No trochanteric bursitis. -Proximal hamstring: No tendon tear or tendinopathy. -Iliopsoas: No tendon tear or tendinopathy. No bursitis. -Rectus femoris origin: No tear or tendinopathy. BONES: -No fracture or concerning marrow replacing lesion. Tiny degenerative marginal osteophyte femoral head neck junction. SOFT TISSUES: -Normal muscle bulk. No acute muscular injury. INTRA-PELVIC CONTENTS: -Sigmoid diverticulosis.     IMPRESSION: 1.  Mild degenerative arthritis left hip with diffuse thinning of the articular cartilage and areas of full-thickness/near full-thickness chondral thinning at the medial and superolateral femoral head. 2.  Circumferential degenerative blunting of the acetabular labrum with degenerative posterior and superior labral calcification. 3.  No acute fracture or stress reaction.      4/22/25 MRI lumbar spine:    IMPRESSION:  1.  Multilevel disc degeneration and facet hypertrophy as described above.  2.  Marrow edema involving the L5 pedicles and L4-L5 facets with adjacent paraspinal soft tissue edema and enhancement. This is favored to represent reactive/degenerative edema. Infection is felt to be less likely but not entirely excluded.

## 2025-06-05 ENCOUNTER — THERAPY VISIT (OUTPATIENT)
Dept: PHYSICAL THERAPY | Facility: CLINIC | Age: 70
End: 2025-06-05
Payer: COMMERCIAL

## 2025-06-05 DIAGNOSIS — M54.50 BILATERAL LOW BACK PAIN WITHOUT SCIATICA, UNSPECIFIED CHRONICITY: Primary | ICD-10-CM

## 2025-06-07 ENCOUNTER — HEALTH MAINTENANCE LETTER (OUTPATIENT)
Age: 70
End: 2025-06-07

## 2025-06-18 ENCOUNTER — THERAPY VISIT (OUTPATIENT)
Dept: PHYSICAL THERAPY | Facility: CLINIC | Age: 70
End: 2025-06-18
Payer: COMMERCIAL

## 2025-06-18 DIAGNOSIS — M54.50 BILATERAL LOW BACK PAIN WITHOUT SCIATICA, UNSPECIFIED CHRONICITY: Primary | ICD-10-CM

## 2025-06-18 PROCEDURE — 97140 MANUAL THERAPY 1/> REGIONS: CPT | Mod: GP

## 2025-06-18 PROCEDURE — 97110 THERAPEUTIC EXERCISES: CPT | Mod: GP

## 2025-07-01 ENCOUNTER — RADIOLOGY INJECTION OFFICE VISIT (OUTPATIENT)
Dept: PHYSICAL MEDICINE AND REHAB | Facility: CLINIC | Age: 70
End: 2025-07-01
Attending: STUDENT IN AN ORGANIZED HEALTH CARE EDUCATION/TRAINING PROGRAM
Payer: COMMERCIAL

## 2025-07-01 VITALS
SYSTOLIC BLOOD PRESSURE: 128 MMHG | OXYGEN SATURATION: 97 % | RESPIRATION RATE: 16 BRPM | WEIGHT: 125 LBS | BODY MASS INDEX: 23.6 KG/M2 | HEART RATE: 58 BPM | TEMPERATURE: 97.5 F | HEIGHT: 61 IN | DIASTOLIC BLOOD PRESSURE: 68 MMHG

## 2025-07-01 DIAGNOSIS — M53.3 SACROILIAC JOINT DYSFUNCTION OF RIGHT SIDE: ICD-10-CM

## 2025-07-01 RX ORDER — TRIAMCINOLONE ACETONIDE 40 MG/ML
INJECTION, SUSPENSION INTRA-ARTICULAR; INTRAMUSCULAR
Status: COMPLETED | OUTPATIENT
Start: 2025-07-01 | End: 2025-07-01

## 2025-07-01 RX ORDER — ROPIVACAINE HYDROCHLORIDE 5 MG/ML
INJECTION, SOLUTION EPIDURAL; INFILTRATION; PERINEURAL
Status: COMPLETED | OUTPATIENT
Start: 2025-07-01 | End: 2025-07-01

## 2025-07-01 RX ORDER — LIDOCAINE HYDROCHLORIDE 10 MG/ML
INJECTION, SOLUTION EPIDURAL; INFILTRATION; INTRACAUDAL; PERINEURAL
Status: COMPLETED | OUTPATIENT
Start: 2025-07-01 | End: 2025-07-01

## 2025-07-01 RX ADMIN — LIDOCAINE HYDROCHLORIDE 2 ML: 10 INJECTION, SOLUTION EPIDURAL; INFILTRATION; INTRACAUDAL; PERINEURAL at 13:02

## 2025-07-01 RX ADMIN — TRIAMCINOLONE ACETONIDE 40 MG: 40 INJECTION, SUSPENSION INTRA-ARTICULAR; INTRAMUSCULAR at 13:04

## 2025-07-01 RX ADMIN — ROPIVACAINE HYDROCHLORIDE 2 ML: 5 INJECTION, SOLUTION EPIDURAL; INFILTRATION; PERINEURAL at 13:04

## 2025-07-01 ASSESSMENT — PAIN SCALES - GENERAL
PAINLEVEL_OUTOF10: MILD PAIN (1)
PAINLEVEL_OUTOF10: MILD PAIN (1)

## 2025-07-01 NOTE — PATIENT INSTRUCTIONS
DISCHARGE INSTRUCTIONS    During office hours (8:00 a.m.- 4:00 p.m.) questions or concerns may be answered  by calling Spine Center Navigation Nurses at  [phone].  Messages received after hours will be returned the following business day.      In the case of an emergency, please dial 911 or seek assistance at the nearest Emergency Room/Urgent Care facility.     All Patients:    You may experience an increase in your symptoms for the first 2 days (It may take anywhere between 2 days - 2 weeks for the steroid to have maximum effect).    You may use ice on the injection site, as frequently as 20 minutes each hour if needed.    You may take your pain medicine.    You may continue taking your regular medication after your injection. If you have had a medial branch block you may resume pain medication once your pain diary is completed.    You may shower. No swimming, tub bath, or hot tub for 48 hours.  You may remove your bandaid/bandage as soon as you are home.    You may resume light activities, as tolerated.    Resume your usual diet as tolerated.    If you were told to hold any blood thinning medications you may resume taking them 24 hours after your procedure as prescribed.    It is strongly advised that you do not drive for 1-3 hours post injection.    If you have had oral sedation:  Do not drive for 8 hours post injection.        POSSIBLE STEROID SIDE EFFECTS (If steroid/cortisone was used for your procedure    Swelling of the legs              Skin redness (flushing)     Mouth (oral) irritation   Increased blood sugar (glucose) levels            Sweats                    Mood changes  Headache  Sleeplessness  Weakened immune system for up to 14 days, which could increase the risk of sherman the COVID-19 virus and/or experiencing more severe symptoms of the disease, if exposed.  Decreased effectiveness of vaccines if given within 2 weeks of the steroid.    -If you experience these symptoms, it should  only last for a short period         POSSIBLE PROCEDURE SIDE EFFECTS    Increased Pain           Increased numbness/tingling      Nausea/Vomiting          Bruising/bleeding at site      Redness or swelling                                              Difficulty walking      Weakness           Fever greater than 100.5    -Call the Spine Center if you are concerned    *In the event of a severe headache after an epidural steroid injection that is relieved by lying down, please call the [facility] to speak with a clinical staff member*

## 2025-07-21 DIAGNOSIS — N95.1 MENOPAUSAL VAGINAL DRYNESS: ICD-10-CM

## 2025-07-21 RX ORDER — ESTRADIOL 10 UG/1
10 TABLET, FILM COATED VAGINAL
Qty: 24 TABLET | Refills: 2 | Status: SHIPPED | OUTPATIENT
Start: 2025-07-21

## 2025-07-21 NOTE — TELEPHONE ENCOUNTER
Requested Prescriptions   Pending Prescriptions Disp Refills    estradiol (VAGIFEM) 10 MCG TABS vaginal tablet [Pharmacy Med Name: estradiol 10 mcg vaginal tablet] 24 tablet 3     Sig: Place 1 tablet (10 mcg) vaginally twice a week.       Hormone Replacement Therapy (vaginal) Passed - 7/21/2025 11:48 AM        Passed - Medication is active on med list and the sig matches. RN to manually verify dose and sig if red X/fail.     If the protocol passes (green check), you do not need to verify med dose and sig.    A prescription matches if they are the same clinical intention.    For Example: once daily and every morning are the same.    The protocol can not identify upper and lower case letters as matching and will fail.     For Example: Take 1 tablet (50 mg) by mouth daily     TAKE 1 TABLET (50 MG) BY MOUTH DAILY    For all fails (red x), verify dose and sig.    If the refill does match what is on file, the RN can still proceed to approve the refill request.       If they do not match, route to the appropriate provider.             Passed - Recent (12 month) or future (90 days) visit with authorizing provider's specialty (provided they have been seen in the past 15 months)     The patient must have completed an in-person or virtual visit within the past 12 months or has a future visit scheduled within the next 90 days with the authorizing provider s specialty.  Urgent care and e-visits do not qualify as an office visit for this protocol.          Passed - Medication indicated for associated diagnosis     Medication is associated with one or more of the following diagnoses:     Menopause   Vulva/Vaginal atrophy   UTI prophylaxis          Passed - Patient is 18 years of age or older        Passed - No active pregnancy on record        Passed - No positive pregnancy test on record in past 12 months             Last Written Prescription Date:  9/2/24  Last Fill Quantity: 24,  # refills: 3   Last office visit: 3/17/2025    Future Office Visit:  none.    Prescription approved per [facility] Refill Protocol.    Myra Crisostomo RN

## 2025-07-31 ENCOUNTER — THERAPY VISIT (OUTPATIENT)
Dept: PHYSICAL THERAPY | Facility: CLINIC | Age: 70
End: 2025-07-31
Payer: COMMERCIAL

## 2025-07-31 DIAGNOSIS — M54.50 BILATERAL LOW BACK PAIN WITHOUT SCIATICA, UNSPECIFIED CHRONICITY: Primary | ICD-10-CM

## 2025-08-11 ENCOUNTER — OFFICE VISIT (OUTPATIENT)
Dept: PHYSICAL MEDICINE AND REHAB | Facility: CLINIC | Age: 70
End: 2025-08-11
Payer: COMMERCIAL

## 2025-08-11 VITALS
BODY MASS INDEX: 23.6 KG/M2 | DIASTOLIC BLOOD PRESSURE: 56 MMHG | HEIGHT: 61 IN | WEIGHT: 125 LBS | OXYGEN SATURATION: 98 % | SYSTOLIC BLOOD PRESSURE: 111 MMHG | HEART RATE: 73 BPM

## 2025-08-11 DIAGNOSIS — M53.3 SACROILIAC JOINT DYSFUNCTION OF RIGHT SIDE: Primary | ICD-10-CM

## 2025-08-11 DIAGNOSIS — M25.551 CHRONIC HIP PAIN, BILATERAL: ICD-10-CM

## 2025-08-11 DIAGNOSIS — M25.552 CHRONIC HIP PAIN, BILATERAL: ICD-10-CM

## 2025-08-11 DIAGNOSIS — G89.29 CHRONIC HIP PAIN, BILATERAL: ICD-10-CM

## 2025-08-11 PROCEDURE — 99214 OFFICE O/P EST MOD 30 MIN: CPT | Performed by: STUDENT IN AN ORGANIZED HEALTH CARE EDUCATION/TRAINING PROGRAM

## 2025-08-11 PROCEDURE — 3074F SYST BP LT 130 MM HG: CPT | Performed by: STUDENT IN AN ORGANIZED HEALTH CARE EDUCATION/TRAINING PROGRAM

## 2025-08-11 PROCEDURE — 3078F DIAST BP <80 MM HG: CPT | Performed by: STUDENT IN AN ORGANIZED HEALTH CARE EDUCATION/TRAINING PROGRAM

## 2025-08-11 PROCEDURE — 1125F AMNT PAIN NOTED PAIN PRSNT: CPT | Performed by: STUDENT IN AN ORGANIZED HEALTH CARE EDUCATION/TRAINING PROGRAM

## 2025-08-11 ASSESSMENT — PAIN SCALES - GENERAL: PAINLEVEL_OUTOF10: MILD PAIN (1)

## 2025-08-13 ENCOUNTER — THERAPY VISIT (OUTPATIENT)
Dept: PHYSICAL THERAPY | Facility: CLINIC | Age: 70
End: 2025-08-13
Payer: COMMERCIAL

## 2025-08-13 DIAGNOSIS — M54.50 BILATERAL LOW BACK PAIN WITHOUT SCIATICA, UNSPECIFIED CHRONICITY: Primary | ICD-10-CM

## 2025-08-13 PROCEDURE — 97110 THERAPEUTIC EXERCISES: CPT | Mod: GP

## 2025-08-27 ENCOUNTER — THERAPY VISIT (OUTPATIENT)
Dept: PHYSICAL THERAPY | Facility: CLINIC | Age: 70
End: 2025-08-27
Payer: COMMERCIAL

## 2025-08-27 DIAGNOSIS — M54.50 BILATERAL LOW BACK PAIN WITHOUT SCIATICA, UNSPECIFIED CHRONICITY: Primary | ICD-10-CM

## 2025-08-27 PROCEDURE — 97110 THERAPEUTIC EXERCISES: CPT | Mod: GP
